# Patient Record
Sex: MALE | Race: WHITE | Employment: UNEMPLOYED | URBAN - METROPOLITAN AREA
[De-identification: names, ages, dates, MRNs, and addresses within clinical notes are randomized per-mention and may not be internally consistent; named-entity substitution may affect disease eponyms.]

---

## 2017-05-22 ENCOUNTER — HOSPITAL ENCOUNTER (OUTPATIENT)
Dept: LAB | Age: 27
Discharge: HOME | End: 2017-05-22
Payer: MEDICARE

## 2017-05-22 ENCOUNTER — OFFICE VISIT (OUTPATIENT)
Dept: INTERNAL MEDICINE CLINIC | Age: 27
End: 2017-05-22

## 2017-05-22 VITALS
HEIGHT: 68 IN | HEART RATE: 86 BPM | SYSTOLIC BLOOD PRESSURE: 123 MMHG | BODY MASS INDEX: 29.4 KG/M2 | DIASTOLIC BLOOD PRESSURE: 82 MMHG | WEIGHT: 194 LBS | OXYGEN SATURATION: 98 % | RESPIRATION RATE: 18 BRPM | TEMPERATURE: 98.1 F

## 2017-05-22 DIAGNOSIS — Z51.81 MEDICATION MONITORING ENCOUNTER: ICD-10-CM

## 2017-05-22 DIAGNOSIS — Z00.00 PREVENTATIVE HEALTH CARE: Primary | ICD-10-CM

## 2017-05-22 PROCEDURE — 85025 COMPLETE CBC W/AUTO DIFF WBC: CPT

## 2017-05-22 PROCEDURE — 80164 ASSAY DIPROPYLACETIC ACD TOT: CPT

## 2017-05-22 PROCEDURE — 80053 COMPREHEN METABOLIC PANEL: CPT

## 2017-05-22 PROCEDURE — 36415 COLL VENOUS BLD VENIPUNCTURE: CPT

## 2017-05-22 RX ORDER — DIVALPROEX SODIUM 250 MG/1
TABLET, DELAYED RELEASE ORAL
Refills: 10 | COMMUNITY
Start: 2017-05-08 | End: 2018-04-30 | Stop reason: ALTCHOICE

## 2017-05-22 NOTE — PATIENT INSTRUCTIONS

## 2017-05-22 NOTE — PROGRESS NOTES
HISTORY OF PRESENT ILLNESS  Roberto Wayne is a 27 y.o. male. HPI  Roberto Wayne is here for complete health maintenance physical exam and screening. he does not have other concerns. Health maintenance hx includes:  Exercise: moderately active. Form of exercise: wts, bike   Diet: generally follows a low fat low cholesterol diet         No results found for: CHOL, CHOLPOCT, CHOLX, CHLST, CHOLV, HDL, HDLPOC, LDL, LDLCPOC, NLDLCT, DLDL, LDLC, DLDLP, VLDLC, VLDL, TGL, TGLX, TRIGL, [id number], TRIGP, TGLPOCT, CHHD, CHHDX    Lab Results   Component Value Date/Time    Glucose 101 08/03/2016 12:14 PM    Glucose (POC) 96 01/03/2014 05:26 PM         Immunizations:     Immunization History   Administered Date(s) Administered    TB Skin Test (PPD) Intradermal 09/17/2013    Tdap 08/01/2013      Immunization status: up to date and documented. Social History     Social History    Marital status: SINGLE     Spouse name: N/A    Number of children: N/A    Years of education: N/A     Occupational History    Not on file. Social History Main Topics    Smoking status: Never Smoker    Smokeless tobacco: Never Used    Alcohol use No      Comment: no     Drug use: No    Sexual activity: No     Other Topics Concern    Not on file     Social History Narrative     History reviewed. No pertinent surgical history. Family History   Problem Relation Age of Onset    Diabetes Mother     Hypertension Mother     Hypertension Father     Heart Disease Father      cardiomyopathy    Heart Disease Maternal Grandmother     Stroke Maternal Grandfather     Cancer Neg Hx     Seizures Neg Hx      Current Outpatient Prescriptions on File Prior to Visit   Medication Sig Dispense Refill    mometasone (NASONEX) 50 mcg/actuation nasal spray 2 Sprays by Both Nostrils route daily. As needed. 2 Container 2    melatonin 3 mg tablet Take 3 mg by mouth as needed.       Cetirizine (ZYRTEC) 10 mg cap Take  by mouth daily as needed.  ibuprofen (MOTRIN) 200 mg tablet Take 200 mg by mouth every six (6) hours as needed for Pain.  DOCUSATE SODIUM (DULCOLAX STOOL SOFTENER, DSS, PO) Take  by mouth. One pill in the pm      omeprazole (PRILOSEC) 20 mg capsule Take 20 mg by mouth daily. No current facility-administered medications on file prior to visit. .    Review of Systems   Constitutional: Negative for malaise/fatigue and weight loss. Eyes: Negative for blurred vision and pain. Respiratory: Negative for cough, shortness of breath and wheezing. Cardiovascular: Negative for chest pain, palpitations and leg swelling. Gastrointestinal: Negative for abdominal pain, blood in stool, constipation, diarrhea, heartburn, nausea and vomiting. Genitourinary: Negative. Musculoskeletal: Positive for joint pain. Negative for back pain and myalgias. Occasional residual R knee/ patellar pain with kneeling. Prior fracture in distant past     Skin: Negative for rash. Neurological: Negative for dizziness, seizures and headaches. Endo/Heme/Allergies: Negative for environmental allergies. Does not bruise/bleed easily. Psychiatric/Behavioral: Negative for depression. The patient is not nervous/anxious and does not have insomnia. Physical Exam   Constitutional: He is oriented to person, place, and time. He appears well-developed and well-nourished. No distress. Body mass index is 29.94 kg/(m^2). /82 (BP 1 Location: Left arm, BP Patient Position: Sitting)  Pulse 86  Temp 98.1 °F (36.7 °C) (Oral)   Resp 18  Ht 5' 7.5\" (1.715 m)  Wt 194 lb (88 kg)  SpO2 98%  BMI 29.94 kg/m2   HENT:   Head: Normocephalic and atraumatic. Right Ear: Hearing, tympanic membrane and ear canal normal.   Left Ear: Hearing, tympanic membrane and ear canal normal.   Nose: Nose normal.   Mouth/Throat: Oropharynx is clear and moist and mucous membranes are normal. Normal dentition.    Eyes: Conjunctivae and lids are normal. Pupils are equal, round, and reactive to light. Right eye exhibits no discharge. Left eye exhibits no discharge. No scleral icterus. Neck: Trachea normal. No thyromegaly present. Cardiovascular: Normal rate, regular rhythm, normal heart sounds, intact distal pulses and normal pulses. Exam reveals no gallop and no friction rub. No murmur heard. Pulmonary/Chest: Effort normal and breath sounds normal. No respiratory distress. Abdominal: Soft. Normal appearance and bowel sounds are normal. He exhibits no distension and no mass. There is no hepatosplenomegaly. There is no tenderness. There is no CVA tenderness. Musculoskeletal: Normal range of motion. He exhibits no edema or tenderness. Lymphadenopathy:     He has no cervical adenopathy. Neurological: He is alert and oriented to person, place, and time. Skin: Skin is warm and dry. No rash noted. He is not diaphoretic. Psychiatric: He has a normal mood and affect. His speech is normal and behavior is normal. Judgment and thought content normal. Cognition and memory are normal.   Nursing note and vitals reviewed. ASSESSMENT and PLAN  Vashti Tineo was seen today for well male. Diagnoses and all orders for this visit:    Preventative health care  -     METABOLIC PANEL, Rickie SPENCER Seaman 89 was counseled on age-appropriate/ guideline-based risk prevention behaviors and screening for a 27y.o. year old   male . We also discussed adjustments in screening based on family history if necessary. Printed instructions for preventative screening guidelines and healthy behaviors given to patient with after visit summary. Medication monitoring encounter  -     METABOLIC PANEL, COMPREHENSIVE  -     CBC WITH AUTOMATED DIFF  -     VALPROIC ACID      Follow-up Disposition:  Return in about 1 year (around 5/22/2018).

## 2017-05-22 NOTE — MR AVS SNAPSHOT
Visit Information Date & Time Provider Department Dept. Phone Encounter #  
 5/22/2017  2:30 PM Warden Aurea MD [facility] [address] [id number] Follow-up Instructions Return in about 1 year (around 5/22/2018). Upcoming Health Maintenance Date Due  
 MEDICARE YEARLY EXAM 9/7/2010 INFLUENZA AGE 9 TO ADULT 8/1/2017 DTaP/Tdap/Td series (2 - Td) 8/1/2023 Allergies as of 5/22/2017  Review Complete On: 5/22/2017 By: Warden Aurea MD  
  
 Severity Noted Reaction Type Reactions Contrast Agent [Iodine]  05/30/2011    Itching Vicodin [Hydrocodone-acetaminophen]  01/12/2012    Other (comments) Mother states that Vicodin makes patient very mean. Current Immunizations  Reviewed on 5/22/2017 Name Date  
 TB Skin Test (PPD) Intradermal 9/17/2013 Tdap 8/1/2013 Reviewed by Warden Aurea MD on 5/22/2017 at  3:05 PM  
 Reviewed by Warden Aurea MD on 5/22/2017 at  3:07 PM  
You Were Diagnosed With   
  
 Codes Comments Preventative health care    -  Primary ICD-10-CM: Z00.00 ICD-9-CM: V70.0 Medication monitoring encounter     ICD-10-CM: Z51.81 
ICD-9-CM: V58.83 Vitals BP Pulse Temp Resp Height(growth percentile) Weight(growth percentile) 123/82 (BP 1 Location: Left arm, BP Patient Position: Sitting) 86 98.1 °F (36.7 °C) (Oral) 18 5' 7.5\" (1.715 m) 194 lb (88 kg) SpO2 BMI Smoking Status 98% 29.94 kg/m2 Never Smoker BMI and BSA Data Body Mass Index Body Surface Area  
 29.94 kg/m 2 2.05 m 2 Preferred Pharmacy Pharmacy Name Phone [facility] [address] [place] [address], [address] [place] [address] [phone] Your Updated Medication List  
  
   
This list is accurate as of: 5/22/17  3:13 PM.  Always use your most recent med list.  
  
  
  
  
 divalproex  mg tablet Commonly known as:  DEPAKOTE  
 TK 2TS PO Q DAY IN THE EVENING  
  
 DULCOLAX STOOL SOFTENER (DSS) PO Take  by mouth. One pill in the pm  
  
 ibuprofen 200 mg tablet Commonly known as:  MOTRIN Take 200 mg by mouth every six (6) hours as needed for Pain.  
  
 melatonin 3 mg tablet Take 3 mg by mouth as needed. mometasone 50 mcg/actuation nasal spray Commonly known as:  NASONEX  
2 Sprays by Both Nostrils route daily. As needed. PriLOSEC 20 mg capsule Generic drug:  omeprazole Take 20 mg by mouth daily. ZyrTEC 10 mg Cap Generic drug:  Cetirizine Take  by mouth daily as needed. We Performed the Following CBC WITH AUTOMATED DIFF [65370 CPT(R)] METABOLIC PANEL, COMPREHENSIVE [98504 CPT(R)] VALPROIC ACID [04758 CPT(R)] Follow-up Instructions Return in about 1 year (around 5/22/2018). Patient Instructions Well Visit, Ages 25 to 48: Care Instructions Your Care Instructions Physical exams can help you stay healthy. Your doctor has checked your overall health and may have suggested ways to take good care of yourself. He or she also may have recommended tests. At home, you can help prevent illness with healthy eating, regular exercise, and other steps. Follow-up care is a key part of your treatment and safety. Be sure to make and go to all appointments, and call your doctor if you are having problems. It's also a good idea to know your test results and keep a list of the medicines you take. How can you care for yourself at home? · Reach and stay at a healthy weight. This will lower your risk for many problems, such as obesity, diabetes, heart disease, and high blood pressure. · Get at least 30 minutes of physical activity on most days of the week. Walking is a good choice. You also may want to do other activities, such as running, swimming, cycling, or playing tennis or team sports. Discuss any changes in your exercise program with your doctor. · Do not smoke or allow others to smoke around you. If you need help quitting, talk to your doctor about stop-smoking programs and medicines. These can increase your chances of quitting for good. · Talk to your doctor about whether you have any risk factors for sexually transmitted infections (STIs). Having one sex partner (who does not have STIs and does not have sex with anyone else) is a good way to avoid these infections. · Use birth control if you do not want to have children at this time. Talk with your doctor about the choices available and what might be best for you. · Protect your skin from too much sun. When you're outdoors from 10 a.m. to 4 p.m., stay in the shade or cover up with clothing and a hat with a wide brim. Wear sunglasses that block UV rays. Even when it's cloudy, put broad-spectrum sunscreen (SPF 30 or higher) on any exposed skin. · See a dentist one or two times a year for checkups and to have your teeth cleaned. · Wear a seat belt in the car. · Drink alcohol in moderation, if at all. That means no more than 2 drinks a day for men and 1 drink a day for women. Follow your doctor's advice about when to have certain tests. These tests can spot problems early. For everyone · Cholesterol. Have the fat (cholesterol) in your blood tested after age 21. Your doctor will tell you how often to have this done based on your age, family history, or other things that can increase your risk for heart disease. · Blood pressure. Have your blood pressure checked during a routine doctor visit. Your doctor will tell you how often to check your blood pressure based on your age, your blood pressure results, and other factors. · Vision. Talk with your doctor about how often to have a glaucoma test. 
· Diabetes. Ask your doctor whether you should have tests for diabetes. · Colon cancer. Have a test for colon cancer at age 48.  You may have one of several tests. If you are younger than 48, you may need a test earlier if you have any risk factors. Risk factors include whether you already had a precancerous polyp removed from your colon or whether your parent, brother, sister, or child has had colon cancer. For women · Breast exam and mammogram. Talk to your doctor about when you should have a clinical breast exam and a mammogram. Medical experts differ on whether and how often women under 50 should have these tests. Your doctor can help you decide what is right for you. · Pap test and pelvic exam. Begin Pap tests at age 24. A Pap test is the best way to find cervical cancer. The test often is part of a pelvic exam. Ask how often to have this test. 
· Tests for sexually transmitted infections (STIs). Ask whether you should have tests for STIs. You may be at risk if you have sex with more than one person, especially if your partners do not wear condoms. For men · Tests for sexually transmitted infections (STIs). Ask whether you should have tests for STIs. You may be at risk if you have sex with more than one person, especially if you do not wear a condom. · Testicular cancer exam. Ask your doctor whether you should check your testicles regularly. · Prostate exam. Talk to your doctor about whether you should have a blood test (called a PSA test) for prostate cancer. Experts differ on whether and when men should have this test. Some experts suggest it if you are older than 39 and are -American or have a father or brother who got prostate cancer when he was younger than 72. When should you call for help? Watch closely for changes in your health, and be sure to contact your doctor if you have any problems or symptoms that concern you. Where can you learn more? Go to http://babak-tiffanie.info/. Enter P072 in the search box to learn more about \"Well Visit, Ages 25 to 48: Care Instructions. \" Current as of: July 19, 2016 Content Version: 11.2 © 3973-0837 [employer], [employer]. Care instructions adapted under license by [employer] (which disclaims liability or warranty for this information). If you have questions about a medical condition or this instruction, always ask your healthcare professional. [address] any warranty or liability for your use of this information. Introducing [facility]! [employer] introduces [employer] patient portal. Now you can access parts of your medical record, email your doctor's office, and request medication refills online. 1. In your internet browser, go to [email or web address] 2. Click on the First Time User? Click Here link in the Sign In box. You will see the New Member Sign Up page. 3. Enter your [employer] Access Code exactly as it appears below. You will not need to use this code after youve completed the sign-up process. If you do not sign up before the expiration date, you must request a new code. · [employer] Access Code: [email or web address] Expires: 8/20/2017  3:13 PM 
 
4. Enter the last four digits of your Social Security Number (xxxx) and Date of Birth (mm/dd/yyyy) as indicated and click Submit. You will be taken to the next sign-up page. 5. Create a [employer] ID. This will be your [employer] login ID and cannot be changed, so think of one that is secure and easy to remember. 6. Create a [employer] password. You can change your password at any time. 7. Enter your Password Reset Question and Answer. This can be used at a later time if you forget your password. 8. Enter your e-mail address. You will receive e-mail notification when new information is available in [address] 9. Click Sign Up. You can now view and download portions of your medical record. 10. Click the Download Summary menu link to download a portable copy of your medical information. If you have questions, please visit the Frequently Asked Questions section of the [employer] website. Remember, [employer] is NOT to be used for urgent needs. For medical emergencies, dial 911. Now available from your iPhone and Android! Please provide this summary of care documentation to your next provider. Your primary care clinician is listed as Paola Raymond. If you have any questions after today's visit, please call [phone].

## 2017-05-23 ENCOUNTER — TELEPHONE (OUTPATIENT)
Dept: INTERNAL MEDICINE CLINIC | Age: 27
End: 2017-05-23

## 2017-05-23 DIAGNOSIS — K21.9 GASTROESOPHAGEAL REFLUX DISEASE, ESOPHAGITIS PRESENCE NOT SPECIFIED: ICD-10-CM

## 2017-05-23 LAB
ALBUMIN SERPL-MCNC: 4.8 G/DL (ref 3.5–5.5)
ALBUMIN/GLOB SERPL: 1.7 {RATIO} (ref 1.2–2.2)
ALP SERPL-CCNC: 59 IU/L (ref 39–117)
ALT SERPL-CCNC: 15 IU/L (ref 0–44)
AST SERPL-CCNC: 16 IU/L (ref 0–40)
BASOPHILS # BLD AUTO: 0 X10E3/UL (ref 0–0.2)
BASOPHILS NFR BLD AUTO: 0 %
BILIRUB SERPL-MCNC: 0.3 MG/DL (ref 0–1.2)
BUN SERPL-MCNC: 17 MG/DL (ref 6–20)
BUN/CREAT SERPL: 23 (ref 9–20)
CALCIUM SERPL-MCNC: 9.9 MG/DL (ref 8.7–10.2)
CHLORIDE SERPL-SCNC: 98 MMOL/L (ref 96–106)
CO2 SERPL-SCNC: 25 MMOL/L (ref 18–29)
CREAT SERPL-MCNC: 0.74 MG/DL (ref 0.76–1.27)
EOSINOPHIL # BLD AUTO: 0.2 X10E3/UL (ref 0–0.4)
EOSINOPHIL NFR BLD AUTO: 3 %
ERYTHROCYTE [DISTWIDTH] IN BLOOD BY AUTOMATED COUNT: 14.2 % (ref 12.3–15.4)
GLOBULIN SER CALC-MCNC: 2.8 G/DL (ref 1.5–4.5)
GLUCOSE SERPL-MCNC: 82 MG/DL (ref 65–99)
HCT VFR BLD AUTO: 44.3 % (ref 37.5–51)
HGB BLD-MCNC: 15 G/DL (ref 12.6–17.7)
IMM GRANULOCYTES # BLD: 0 X10E3/UL (ref 0–0.1)
IMM GRANULOCYTES NFR BLD: 0 %
LYMPHOCYTES # BLD AUTO: 2.6 X10E3/UL (ref 0.7–3.1)
LYMPHOCYTES NFR BLD AUTO: 35 %
MCH RBC QN AUTO: 30.2 PG (ref 26.6–33)
MCHC RBC AUTO-ENTMCNC: 33.9 G/DL (ref 31.5–35.7)
MCV RBC AUTO: 89 FL (ref 79–97)
MONOCYTES # BLD AUTO: 0.6 X10E3/UL (ref 0.1–0.9)
MONOCYTES NFR BLD AUTO: 8 %
NEUTROPHILS # BLD AUTO: 4 X10E3/UL (ref 1.4–7)
NEUTROPHILS NFR BLD AUTO: 54 %
PLATELET # BLD AUTO: 250 X10E3/UL (ref 150–379)
POTASSIUM SERPL-SCNC: 4.4 MMOL/L (ref 3.5–5.2)
PROT SERPL-MCNC: 7.6 G/DL (ref 6–8.5)
RBC # BLD AUTO: 4.97 X10E6/UL (ref 4.14–5.8)
SODIUM SERPL-SCNC: 140 MMOL/L (ref 134–144)
VALPROATE SERPL-MCNC: 73 UG/ML (ref 50–100)
WBC # BLD AUTO: 7.5 X10E3/UL (ref 3.4–10.8)

## 2017-05-23 NOTE — TELEPHONE ENCOUNTER
Patient's mother Jayesh Manual requests that the completed paperwork that was given to Dr. Margi Costello yesterday gets faxed to her at [phone]. Needs to be turned into [employer] by 6/1/17.

## 2017-06-12 DIAGNOSIS — K21.9 GASTROESOPHAGEAL REFLUX DISEASE WITHOUT ESOPHAGITIS: ICD-10-CM

## 2017-06-12 RX ORDER — OMEPRAZOLE 20 MG/1
20 CAPSULE, DELAYED RELEASE ORAL DAILY
Qty: 30 CAP | Refills: 2 | Status: SHIPPED | OUTPATIENT
Start: 2017-06-12 | End: 2018-12-18 | Stop reason: SDUPTHER

## 2017-08-24 ENCOUNTER — HOSPITAL ENCOUNTER (EMERGENCY)
Age: 27
Discharge: HOME | End: 2017-08-24
Attending: EMERGENCY MEDICINE
Payer: MEDICARE

## 2017-08-24 VITALS
TEMPERATURE: 98.2 F | RESPIRATION RATE: 18 BRPM | HEART RATE: 84 BPM | OXYGEN SATURATION: 95 % | SYSTOLIC BLOOD PRESSURE: 139 MMHG | HEIGHT: 68 IN | BODY MASS INDEX: 30.24 KG/M2 | DIASTOLIC BLOOD PRESSURE: 82 MMHG | WEIGHT: 199.52 LBS
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
## 2017-08-24 DIAGNOSIS — S81.851A DOG BITE OF RIGHT LOWER LEG, INITIAL ENCOUNTER: Primary | ICD-10-CM

## 2017-08-24 PROCEDURE — 77030018836 HC SOL IRR NACL ICUM -A

## 2017-08-24 PROCEDURE — 74011250637 HC RX REV CODE- 250/637: Performed by: PHYSICIAN ASSISTANT

## 2017-08-24 PROCEDURE — 77030002916 HC SUT ETHLN J&J -A

## 2017-08-24 PROCEDURE — 99283 EMERGENCY DEPT VISIT LOW MDM: CPT

## 2017-08-24 PROCEDURE — 75810000293 HC SIMP/SUPERF WND  RPR

## 2017-08-24 PROCEDURE — 74011000250 HC RX REV CODE- 250: Performed by: PHYSICIAN ASSISTANT

## 2017-08-24 RX ORDER — AMOXICILLIN AND CLAVULANATE POTASSIUM 875; 125 MG/1; MG/1
1 TABLET, FILM COATED ORAL
Status: COMPLETED | OUTPATIENT
Start: 2017-08-24 | End: 2017-08-24

## 2017-08-24 RX ORDER — AMOXICILLIN AND CLAVULANATE POTASSIUM 875; 125 MG/1; MG/1
1 TABLET, FILM COATED ORAL 2 TIMES DAILY
Qty: 14 TAB | Refills: 0 | Status: SHIPPED | OUTPATIENT
Start: 2017-08-24 | End: 2017-08-24

## 2017-08-24 RX ORDER — LIDOCAINE HYDROCHLORIDE AND EPINEPHRINE 10; 10 MG/ML; UG/ML
1.5 INJECTION, SOLUTION INFILTRATION; PERINEURAL
Status: DISCONTINUED | OUTPATIENT
Start: 2017-08-24 | End: 2017-08-24

## 2017-08-24 RX ORDER — AMOXICILLIN AND CLAVULANATE POTASSIUM 875; 125 MG/1; MG/1
1 TABLET, FILM COATED ORAL 2 TIMES DAILY
Qty: 14 TAB | Refills: 0 | Status: SHIPPED | OUTPATIENT
Start: 2017-08-24 | End: 2017-08-31

## 2017-08-24 RX ORDER — LIDOCAINE HYDROCHLORIDE AND EPINEPHRINE 20; 10 MG/ML; UG/ML
1.5 INJECTION, SOLUTION INFILTRATION; PERINEURAL ONCE
Status: COMPLETED | OUTPATIENT
Start: 2017-08-24 | End: 2017-08-24

## 2017-08-24 RX ADMIN — BACITRACIN, NEOMYCIN, POLYMYXIN B 1 PACKET: 400; 3.5; 5 OINTMENT TOPICAL at 19:56

## 2017-08-24 RX ADMIN — AMOXICILLIN AND CLAVULANATE POTASSIUM 1 TABLET: 875; 125 TABLET, FILM COATED ORAL at 19:55

## 2017-08-24 RX ADMIN — LIDOCAINE HYDROCHLORIDE,EPINEPHRINE BITARTRATE 30 MG: 20; .01 INJECTION, SOLUTION INFILTRATION; PERINEURAL at 20:08

## 2017-08-24 NOTE — ED TRIAGE NOTES
Pt states that he was sliding down a sliding board and a friends dog bit his right lower leg. Abrasion noted to the right mid calf.

## 2017-08-24 NOTE — ED PROVIDER NOTES
HPI Comments: Bouchra Verduzco is a 27 y.o. male who presents ambulatory to ER with c/o multiple abrasions and laceration to R lower leg secondary to dog bite one hour ago. Pt states he was sliding down waterslide when his friends dog jumped up and bit pts right lower leg causing laceration/skin flap to right lower leg, puncture wound to posterior R leg, and multiple abrasions surrounding lower leg. States dog is UTD on rabies and all vaccinations. Bleeding controlled. No other complaints. Td UTD. PCP: Chanel Bolden MD   PMHx significant for: Past Medical History:  No date: Epilepsy [facility])      Comment: started age 5   No date: GERD (gastroesophageal reflux disease)  No date: Intellectual disability  No date: Kidney stone  No date: Psychiatric disorder      Comment: mood disorder    PSHx significant for: History reviewed. No pertinent surgical history. -- Contrast Agent (Iodine) -- Itching   -- Vicodin (Hydrocodone-Acetaminophen) -- Other (comments)    --  Mother states that Vicodin makes patient very             mean. There are no other complaints, changes or physical findings at this time. The history is provided by the patient. Past Medical History:   Diagnosis Date    Epilepsy [facility])     started age 5     GERD (gastroesophageal reflux disease)     Intellectual disability     Kidney stone     Psychiatric disorder     mood disorder       History reviewed. No pertinent surgical history. Family History:   Problem Relation Age of Onset    Diabetes Mother     Hypertension Mother     Hypertension Father     Heart Disease Father      cardiomyopathy    Heart Disease Maternal Grandmother     Stroke Maternal Grandfather     Cancer Neg Hx     Seizures Neg Hx        Social History     Social History    Marital status: SINGLE     Spouse name: N/A    Number of children: N/A    Years of education: N/A     Occupational History    Not on file.      Social History Main Topics  Smoking status: Never Smoker    Smokeless tobacco: Never Used    Alcohol use No      Comment: no     Drug use: No    Sexual activity: No     Other Topics Concern    Not on file     Social History Narrative         ALLERGIES: Contrast agent [iodine] and Vicodin [hydrocodone-acetaminophen]    Review of Systems   Constitutional: Negative. HENT: Negative. Eyes: Negative. Respiratory: Negative. Cardiovascular: Negative. Gastrointestinal: Negative. Genitourinary: Negative. Musculoskeletal: Negative. Skin: Positive for wound (multiple abrasions, puncture wounds R lower leg). Neurological: Negative. Hematological: Negative. Psychiatric/Behavioral: Negative. All other systems reviewed and are negative. Vitals:    08/24/17 1923   BP: 139/82   Pulse: 84   Resp: 18   Temp: 98.2 °F (36.8 °C)   SpO2: 95%   Weight: 90.5 kg (199 lb 8.3 oz)   Height: 5' 8\" (1.727 m)            Physical Exam   Constitutional: He is oriented to person, place, and time. He appears well-developed and well-nourished. No distress. HENT:   Head: Normocephalic and atraumatic. Neck: Normal range of motion. Cardiovascular: Intact distal pulses and normal pulses. Musculoskeletal: Normal range of motion. He exhibits no edema or tenderness. Neurological: He is alert and oriented to person, place, and time. He has normal strength. No sensory deficit. Skin: Skin is warm and dry. No rash noted. He is not diaphoretic. No erythema. No pallor. 1.5cm x 1.5cm gaping, V shaped laceration/skin flap to Right anterior lower leg, bleeding controlled, no FB. Several superficial abrasions to surrounding area and shallow puncture wound to posterior R calf, bleeding controlled, no FB. NVI distally, DP pulse 2+, brisk cap refill  See imaged below   Psychiatric: He has a normal mood and affect. His behavior is normal.   Nursing note and vitals reviewed.        MDM  Number of Diagnoses or Management Options  Dog bite of right lower leg, initial encounter:   Diagnosis management comments: DDx: dog bite, laceration, retained FB       Amount and/or Complexity of Data Reviewed  Discuss the patient with other providers: yes (Dr. Tressa Meadows)    Patient Progress  Patient progress: stable    ED Course       Procedures                       8:07 PM   Mickie Quintana PA-C discussed patient with Catalino Reyes MD who is in agreement with care plan as outlined. No further recommendations. Mickie Quintana PA-C      Procedure Note - Laceration Repair:  8:07 PM  Procedure by Mickie Quintana PA-C . Complexity: simple  1.5cm x 1.5cm V shaped skin flap/ laceration to R lower leg  was irrigated copiously with NS under jet lavage, prepped with safclens and draped in a sterile fashion. The area was anesthetized with 3 mLs of  Lidocaine 2% with epinephrine via local infiltration. The wound was explored with the following results: No foreign bodies found, No tendon laceration seen. The wound was repaired with One layer suture closure: Skin Layer:  3 sutures placed, stitch type:simple interrupted, suture: 4-0 nylon. .  The wound was closed with good hemostasis and loose approximation. Dressing applied. Estimated blood loss: <2ccs  The procedure took 16-30 minutes, and pt tolerated well. Wound in ER pre and post repair:                  8:31 PM  Pt has been reevaluated. There are no new complaints, changes, or physical findings at this time. Medications have been reviewed w/ pt and/or family. Pt and/or family's questions have been answered. Pt and/or family expressed good understanding of the dx/tx/rx and is in agreement with plan of care. Pt instructed and agreed to f/u w/ PCP and to return to ED upon further deterioration. Pt is ready for discharge. LABORATORY TESTS:  No results found for this or any previous visit (from the past 12 hour(s)). IMAGING RESULTS:  No orders to display     No results found.       MEDICATIONS GIVEN:  Medications   amoxicillin-clavulanate (AUGMENTIN) 875-125 mg per tablet 1 Tab (1 Tab Oral Given 8/24/17 1955)   neomycin-bacitracnZn-polymyxnB (NEOSPORIN) ointment 1 Packet (1 Packet Topical Given 8/24/17 1956)   lidocaine-EPINEPHrine (XYLOCAINE) 2 %-1:100,000 injection 30 mg (30 mg IntraDERMal Given by Provider 8/24/17 2008)       IMPRESSION:  1. Dog bite of right lower leg, initial encounter        PLAN:  1. Current Discharge Medication List      START taking these medications    Details   amoxicillin-clavulanate (AUGMENTIN) 875-125 mg per tablet Take 1 Tab by mouth two (2) times a day for 7 days. Qty: 14 Tab, Refills: 0         CONTINUE these medications which have NOT CHANGED    Details   omeprazole (PRILOSEC) 20 mg capsule Take 1 Cap by mouth daily. Qty: 30 Cap, Refills: 2    Associated Diagnoses: Gastroesophageal reflux disease without esophagitis      divalproex DR (DEPAKOTE) 250 mg tablet TK 2TS PO Q DAY IN THE EVENING  Refills: 10      Cetirizine (ZYRTEC) 10 mg cap Take  by mouth daily as needed. mometasone (NASONEX) 50 mcg/actuation nasal spray 2 Sprays by Both Nostrils route daily. As needed. Qty: 2 Container, Refills: 2      melatonin 3 mg tablet Take 3 mg by mouth as needed. ibuprofen (MOTRIN) 200 mg tablet Take 200 mg by mouth every six (6) hours as needed for Pain. Associated Diagnoses: Otalgia of right ear; URI (upper respiratory infection)      DOCUSATE SODIUM (DULCOLAX STOOL SOFTENER, DSS, PO) Take  by mouth. One pill in the pm           2.    Follow-up Information     Follow up With Details Comments Contact Info    Emely Wilkes MD Schedule an appointment as soon as possible for a visit in 10 days For suture removal; 10-14 [facility] [phone]  Internal Med [place] [facility]  [place] [address] [facility] [address] DEPT  If symptoms worsen [place] [address] [place] [address]  [phone]            Return to ED if worse

## 2017-08-25 NOTE — ED NOTES
Pt was discharged and given instructions by PA. Pt and mother verbalized good understanding of all discharge instructions,prescriptions and F/U care. All questions answered. Pt in stable condition on discharge.

## 2017-08-25 NOTE — DISCHARGE INSTRUCTIONS
We hope that we have addressed all of your medical concerns. The examination and treatment you received in the Emergency Department were for an emergent problem and were not intended as complete care. It is important that you follow up with your healthcare provider(s) for ongoing care. If your symptoms worsen or do not improve as expected, and you are unable to reach your usual health care provider(s), you should return to the Emergency Department. Today's healthcare is undergoing tremendous change, and patient satisfaction surveys are one of the many tools to assess the quality of medical care. You may receive a survey from the [employer] regarding your experience in the Emergency Department. I hope that your experience has been completely positive, particularly the medical care that I provided. As such, please participate in the survey; anything less than excellent does not meet my expectations or intentions. [facility] and [address] [place] [address] participate in nationally recognized quality of care measures. If your blood pressure is greater than 120/80, as reported below, we urge that you seek medical care to address the potential of high blood pressure, commonly known as hypertension. Hypertension can be hereditary or can be caused by certain medical conditions, pain, stress, or \"white coat syndrome. \"       Please make an appointment with your health care provider(s) for follow up of your Emergency Department visit. VITALS:   Patient Vitals for the past 8 hrs:   Temp Pulse Resp BP SpO2   08/24/17 1923 98.2 °F (36.8 °C) 84 18 139/82 95 %          Thank you for allowing us to provide you with medical care today. We realize that you have many choices for your emergency care needs. Please choose us in the future for any continued health care needs. Earnstine Schirmer  Marianela Hernández, [address] [place] [address].   Office: [phone]            No results found for this or any previous visit (from the past 24 hour(s)). No results found. Animal Bites: Care Instructions  Your Care Instructions  After an animal bite, the biggest concern is infection. The chance of infection depends on the type of animal that bit you, where on your body you were bitten, and your general health. Many animal bites are not closed with stitches, because this can increase the chance of infection. Your bite may take as little as 7 days or as long as several months to heal, depending on how bad it is. Taking good care of your wound at home will help it heal and reduce your chance of infection. The doctor has checked you carefully, but problems can develop later. If you notice any problems or new symptoms, get medical treatment right away. Follow-up care is a key part of your treatment and safety. Be sure to make and go to all appointments, and call your doctor if you are having problems. It's also a good idea to know your test results and keep a list of the medicines you take. How can you care for yourself at home? · If your doctor told you how to care for your wound, follow your doctor's instructions. If you did not get instructions, follow this general advice:  ¨ After 24 to 48 hours, gently wash the wound with clean water 2 times a day. Do not scrub or soak the wound. Don't use hydrogen peroxide or alcohol, which can slow healing. ¨ You may cover the wound with a thin layer of petroleum jelly, such as Vaseline, and a nonstick bandage. ¨ Apply more petroleum jelly and replace the bandage as needed. · After you shower, gently dry the wound with a clean towel. · If your doctor has closed the wound, cover the bandage with a plastic bag before you take a shower. · A small amount of skin redness and swelling around the wound edges and the stitches or staples is normal. Your wound may itch or feel irritated.  Do not scratch or rub the wound.  · Ask your doctor if you can take an over-the-counter pain medicine, such as acetaminophen (Tylenol), ibuprofen (Advil, Motrin), or naproxen (Aleve). Read and follow all instructions on the label. · Do not take two or more pain medicines at the same time unless the doctor told you to. Many pain medicines have acetaminophen, which is Tylenol. Too much acetaminophen (Tylenol) can be harmful. · If your bite puts you at risk for rabies, you will get a series of shots over the next few weeks to prevent rabies. Your doctor will tell you when to get the shots. It is very important that you get the full cycle of shots. Follow your doctor's instructions exactly. · You may need a tetanus shot if you have not received one in the last 5 years. · If your doctor prescribed antibiotics, take them as directed. Do not stop taking them just because you feel better. You need to take the full course of antibiotics. When should you call for help? Call your doctor now or seek immediate medical care if:  · The skin near the bite turns cold or pale or it changes color. · You lose feeling in the area near the bite, or it feels numb or tingly. · You have trouble moving a limb near the bite. · You have symptoms of infection, such as:  ¨ Increased pain, swelling, warmth, or redness near the wound. ¨ Red streaks leading from the wound. ¨ Pus draining from the wound. ¨ A fever. · Blood soaks through the bandage. Oozing small amounts of blood is normal.  · Your pain is getting worse. Watch closely for changes in your health, and be sure to contact your doctor if you are not getting better as expected. Where can you learn more? Go to http://babak-tiffanie.info/. Enter N942 in the search box to learn more about \"Animal Bites: Care Instructions. \"  Current as of: March 20, 2017  Content Version: 11.3  © 6531-7646 [employer].  Care instructions adapted under license by [employer] (which disclaims liability or warranty for this information). If you have questions about a medical condition or this instruction, always ask your healthcare professional. [address] any warranty or liability for your use of this information. Cuts Closed With Stitches: Care Instructions  Your Care Instructions  A cut can happen anywhere on your body. The doctor used stitches to close the cut. Using stitches also helps the cut heal and reduces scarring. Sometimes pieces of tape called Steri-Strips are put over the stitches. If the cut went deep and through the skin, the doctor may have put in two layers of stitches. The deeper layer brings the deep part of the cut together. These stitches will dissolve and don't need to be removed. The stitches in the upper layer are the ones you see on the cut. You will probably have a bandage over the stitches. You will need to have the stitches removed, usually in 7 to 14 days. The doctor has checked you carefully, but problems can develop later. If you notice any problems or new symptoms, get medical treatment right away. Follow-up care is a key part of your treatment and safety. Be sure to make and go to all appointments, and call your doctor if you are having problems. It's also a good idea to know your test results and keep a list of the medicines you take. How can you care for yourself at home? · Keep the cut dry for the first 24 to 48 hours. After this, you can shower if your doctor okays it. Pat the cut dry. · Don't soak the cut, such as in a bathtub. Your doctor will tell you when it's safe to get the cut wet. · If your doctor told you how to care for your cut, follow your doctor's instructions. If you did not get instructions, follow this general advice:  ¨ After the first 24 to 48 hours, wash around the cut with clean water 2 times a day. Don't use hydrogen peroxide or alcohol, which can slow healing.   ¨ You may cover the cut with a thin layer of petroleum jelly, such as Vaseline, and a nonstick bandage. ¨ Apply more petroleum jelly and replace the bandage as needed. · Prop up the sore area on a pillow anytime you sit or lie down during the next 3 days. Try to keep it above the level of your heart. This will help reduce swelling. · Avoid any activity that could cause your cut to reopen. · Do not remove the stitches on your own. Your doctor will tell you when to come back to have the stitches removed. · Leave Steri-Strips on until they fall off. · Be safe with medicines. Read and follow all instructions on the label. ¨ If the doctor gave you a prescription medicine for pain, take it as prescribed. ¨ If you are not taking a prescription pain medicine, ask your doctor if you can take an over-the-counter medicine. When should you call for help? Call your doctor now or seek immediate medical care if:  · You have new pain, or your pain gets worse. · The skin near the cut is cold or pale or changes color. · You have tingling, weakness, or numbness near the cut. · The cut starts to bleed, and blood soaks through the bandage. Oozing small amounts of blood is normal.  · You have trouble moving the area near the cut. · You have symptoms of infection, such as:  ¨ Increased pain, swelling, warmth, or redness around the cut. ¨ Red streaks leading from the cut. ¨ Pus draining from the cut. ¨ A fever. Watch closely for changes in your health, and be sure to contact your doctor if:  · The cut reopens. · You do not get better as expected. Where can you learn more? Go to http://babak-tiffanie.info/. Enter R217 in the search box to learn more about \"Cuts Closed With Stitches: Care Instructions. \"  Current as of: March 20, 2017  Content Version: 11.3  © 9021-3320 [employer]. Care instructions adapted under license by [employer] (which disclaims liability or warranty for this information).  If you have questions about a medical condition or this instruction, always ask your healthcare professional. [place] [address] any warranty or liability for your use of this information.

## 2017-09-07 ENCOUNTER — OFFICE VISIT (OUTPATIENT)
Dept: INTERNAL MEDICINE CLINIC | Age: 27
End: 2017-09-07

## 2017-09-07 VITALS
HEIGHT: 68 IN | WEIGHT: 200 LBS | OXYGEN SATURATION: 98 % | TEMPERATURE: 98.5 F | BODY MASS INDEX: 30.31 KG/M2 | HEART RATE: 93 BPM | SYSTOLIC BLOOD PRESSURE: 118 MMHG | DIASTOLIC BLOOD PRESSURE: 86 MMHG | RESPIRATION RATE: 18 BRPM

## 2017-09-07 DIAGNOSIS — S81.811A LEG LACERATION, RIGHT, INITIAL ENCOUNTER: Primary | ICD-10-CM

## 2017-09-07 NOTE — PROGRESS NOTES
HISTORY OF PRESENT ILLNESS  Bouchra Verduzco is a 24 y.o. male. HPI  Here for suture removal.  R lateral shin laceration - collided with dog on water slide in pool. Accidental bite. Dog with full shots up to date. Sutured 8/24 at [facility] ER  The patient denies fevers, chills or sweats. Given antibx for prevention. Immunization History   Administered Date(s) Administered    TB Skin Test (PPD) Intradermal 09/17/2013    Tdap 08/01/2013       ROS    Physical Exam   Musculoskeletal:        Legs:  Triangular laceration with very good wound healing, opposition without evidence of cellulitis. No drainage, redness, warmth. Minimally tender. 3 sutures removed after peroxide cleansing. Visit Vitals    /86 (BP 1 Location: Left arm, BP Patient Position: Sitting)    Pulse 93    Temp 98.5 °F (36.9 °C) (Oral)    Resp 18    Ht 5' 8\" (1.727 m)    Wt 200 lb (90.7 kg)    SpO2 98%    BMI 30.41 kg/m2         ASSESSMENT and PLAN  Diagnoses and all orders for this visit:    1. Leg laceration, right, initial encounter - no complications  Wound care discussed.   -     REMOVAL OF SUTURES      Follow-up Disposition: Not on File

## 2018-04-30 ENCOUNTER — OFFICE VISIT (OUTPATIENT)
Dept: INTERNAL MEDICINE CLINIC | Age: 28
End: 2018-04-30

## 2018-04-30 VITALS
TEMPERATURE: 98.6 F | SYSTOLIC BLOOD PRESSURE: 119 MMHG | OXYGEN SATURATION: 96 % | HEIGHT: 68 IN | WEIGHT: 191.25 LBS | RESPIRATION RATE: 18 BRPM | HEART RATE: 78 BPM | BODY MASS INDEX: 28.99 KG/M2 | DIASTOLIC BLOOD PRESSURE: 81 MMHG

## 2018-04-30 DIAGNOSIS — Z11.1 TUBERCULOSIS SCREENING: ICD-10-CM

## 2018-04-30 DIAGNOSIS — Z00.00 PREVENTATIVE HEALTH CARE: Primary | ICD-10-CM

## 2018-04-30 RX ORDER — DIVALPROEX SODIUM 250 MG/1
750 TABLET, DELAYED RELEASE ORAL DAILY
Qty: 1 TAB | Refills: 1
Start: 2018-04-30 | End: 2021-10-01

## 2018-04-30 NOTE — LETTER
4/30/2018 1:25 PM 
 
Mr. Eric Kim [address] [address] To whom it may concern: 
 
Eric Neil was evaluated today for a full physical exam.  It my opinion after this evaluation that he is desiring residence with his mother (under the sponsored residential program). I am in agreement and see no contraindications to that plan. Sincerely, Edvin Flores MD

## 2018-04-30 NOTE — MR AVS SNAPSHOT
Catie Euceda 
 
 
 [address] 
[phone] Patient: Artem Ramirez MRN: [id number] UMO:[birth year] Visit Information Date & Time Provider Department Dept. Phone Encounter #  
 4/30/2018  1:00 PM Kali Mac MD [facility] [id number] Follow-up Instructions Return in about 2 days (around 5/2/2018). Your Appointments 6/4/2018  2:30 PM  
Complete Physical with Kali Mac MD  
[facility]) Appt Note: cpe, cs 4/20/18 [address] [place] [id number]  
[phone]  
  
   
 [address] [facility] [id number] Upcoming Health Maintenance Date Due  
 MEDICARE YEARLY EXAM 3/14/2018 Influenza Age 5 to Adult 8/1/2018 DTaP/Tdap/Td series (2 - Td) 8/1/2023 Allergies as of 4/30/2018  Review Complete On: 4/30/2018 By: Thaddeus Colón LPN Severity Noted Reaction Type Reactions Contrast Agent [Iodine]  05/30/2011    Itching Vicodin [Hydrocodone-acetaminophen]  01/12/2012    Other (comments) Mother states that Vicodin makes patient very mean. Current Immunizations  Reviewed on 4/30/2018 Name Date  
 TB Skin Test (PPD) Intradermal  Incomplete, 9/17/2013 Tdap 8/1/2013 Reviewed by Kali Mac MD on 4/30/2018 at  1:18 PM  
You Were Diagnosed With   
  
 Codes Comments Preventative health care    -  Primary ICD-10-CM: Z00.00 ICD-9-CM: V70.0 Tuberculosis screening     ICD-10-CM: Z11.1 ICD-9-CM: V74.1 Vitals BP Pulse Temp Resp Height(growth percentile) Weight(growth percentile) 119/81 (BP 1 Location: Left arm, BP Patient Position: Sitting) 78 98.6 °F (37 °C) (Oral) 18 5' 8\" (1.727 m) 191 lb 4 oz (86.8 kg) SpO2 BMI Smoking Status 96% 29.08 kg/m2 Never Smoker BMI and BSA Data Body Mass Index Body Surface Area 29.08 kg/m 2 2.04 m 2 Preferred Pharmacy Pharmacy Name Phone [facility] [address]  [facility] [phone] Your Updated Medication List  
  
   
This list is accurate as of 4/30/18  1:25 PM.  Always use your most recent med list.  
  
  
  
  
 divalproex  mg tablet Commonly known as:  DEPAKOTE Take 3 Tabs by mouth daily. DULCOLAX STOOL SOFTENER (DSS) PO Take  by mouth. One pill in the pm  
  
 ibuprofen 200 mg tablet Commonly known as:  MOTRIN Take 200 mg by mouth every six (6) hours as needed for Pain.  
  
 melatonin 3 mg tablet Take 3 mg by mouth as needed. mometasone 50 mcg/actuation nasal spray Commonly known as:  NASONEX  
2 Sprays by Both Nostrils route daily. As needed. omeprazole 20 mg capsule Commonly known as:  PriLOSEC Take 1 Cap by mouth daily. ZyrTEC 10 mg Cap Generic drug:  Cetirizine Take  by mouth daily as needed. We Performed the Following AMB POC TUBERCULOSIS, INTRADERMAL (SKIN TEST) [78577 CPT(R)] Follow-up Instructions Return in about 2 days (around 5/2/2018). Introducing [facility]! [facility] introduces [employer]izer patient portal. Now you can access parts of your medical record, email your doctor's office, and request medication refills online. 1. In your internet browser, go to [email or web address]. [employer]/Wholesome Pets 2. Click on the First Time User? Click Here link in the Sign In box. You will see the New Member Sign Up page. 3. Enter your Pollenizer Access Code exactly as it appears below. You will not need to use this code after youve completed the sign-up process. If you do not sign up before the expiration date, you must request a new code. · Pollenizer Access Code: [email or web address] Expires: 7/29/2018  1:25 PM 
 
 4. Enter the last four digits of your Social Security Number (xxxx) and Date of Birth (mm/dd/yyyy) as indicated and click Submit. You will be taken to the next sign-up page. 5. Create a StreamLink Software ID. This will be your [employer] login ID and cannot be changed, so think of one that is secure and easy to remember. 6. Create a [employer] password. You can change your password at any time. 7. Enter your Password Reset Question and Answer. This can be used at a later time if you forget your password. 8. Enter your e-mail address. You will receive e-mail notification when new information is available in [address] 9. Click Sign Up. You can now view and download portions of your medical record. 10. Click the Download Summary menu link to download a portable copy of your medical information. If you have questions, please visit the Frequently Asked Questions section of the Stream[employer] website. Remember, StreamLink Software is NOT to be used for urgent needs. For medical emergencies, dial 911. Now available from your iPhone and Android! Please provide this summary of care documentation to your next provider. Your primary care clinician is listed as Autumn Daley. If you have any questions after today's visit, please call [phone].

## 2018-05-03 ENCOUNTER — TELEPHONE (OUTPATIENT)
Dept: INTERNAL MEDICINE CLINIC | Age: 28
End: 2018-05-03

## 2018-05-03 DIAGNOSIS — R35.0 URINARY FREQUENCY: Primary | ICD-10-CM

## 2018-05-03 LAB
BILIRUB UR QL STRIP: NEGATIVE
GLUCOSE UR-MCNC: NEGATIVE MG/DL
KETONES P FAST UR STRIP-MCNC: NEGATIVE MG/DL
MM INDURATION POC: 0 MM (ref 0–5)
PH UR STRIP: 6.5 [PH] (ref 4.6–8)
PPD POC: NORMAL NEGATIVE
PROT UR QL STRIP: NEGATIVE
SP GR UR STRIP: 1.02 (ref 1–1.03)
UA UROBILINOGEN AMB POC: NORMAL (ref 0.2–1)
URINALYSIS CLARITY POC: CLEAR
URINALYSIS COLOR POC: NORMAL
URINE BLOOD POC: NORMAL
URINE LEUKOCYTES POC: NEGATIVE
URINE NITRITES POC: NEGATIVE

## 2018-05-03 NOTE — TELEPHONE ENCOUNTER
Patient report urinary frequency without odor. Dipped urine and results are in the system for review can call mother with results.

## 2018-05-03 NOTE — TELEPHONE ENCOUNTER
Pt recently  Passed kidney stone following flank pain, hematuria and normal renal function in ER 4/29/18. Urine dipstick shows negative for all components. Let him know if frequent urination not improved over next week, call office.

## 2018-05-11 ENCOUNTER — DOCUMENTATION ONLY (OUTPATIENT)
Dept: INTERNAL MEDICINE CLINIC | Age: 28
End: 2018-05-11

## 2019-04-15 ENCOUNTER — HOSPITAL ENCOUNTER (OUTPATIENT)
Dept: LAB | Age: 29
Discharge: HOME | End: 2019-04-15
Payer: MEDICARE

## 2019-04-15 ENCOUNTER — OFFICE VISIT (OUTPATIENT)
Dept: INTERNAL MEDICINE CLINIC | Age: 29
End: 2019-04-15

## 2019-04-15 VITALS
HEIGHT: 68 IN | BODY MASS INDEX: 28.49 KG/M2 | RESPIRATION RATE: 20 BRPM | OXYGEN SATURATION: 97 % | DIASTOLIC BLOOD PRESSURE: 82 MMHG | TEMPERATURE: 98.2 F | HEART RATE: 89 BPM | SYSTOLIC BLOOD PRESSURE: 136 MMHG | WEIGHT: 188 LBS

## 2019-04-15 DIAGNOSIS — Z11.1 SCREENING-PULMONARY TB: ICD-10-CM

## 2019-04-15 DIAGNOSIS — Z00.00 ENCOUNTER FOR PREVENTIVE CARE: Primary | ICD-10-CM

## 2019-04-15 LAB
BILIRUB UR QL STRIP: NEGATIVE
GLUCOSE UR-MCNC: NEGATIVE MG/DL
KETONES P FAST UR STRIP-MCNC: NEGATIVE MG/DL
PH UR STRIP: 7 [PH] (ref 4.6–8)
PROT UR QL STRIP: NEGATIVE
SP GR UR STRIP: 1.02 (ref 1–1.03)
UA UROBILINOGEN AMB POC: NORMAL (ref 0.2–1)
URINALYSIS CLARITY POC: CLEAR
URINALYSIS COLOR POC: YELLOW
URINE BLOOD POC: NORMAL
URINE LEUKOCYTES POC: NEGATIVE
URINE NITRITES POC: NEGATIVE

## 2019-04-15 PROCEDURE — 87086 URINE CULTURE/COLONY COUNT: CPT

## 2019-04-15 NOTE — PROGRESS NOTES
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 Patient is due for tetanus, will need script. Patient needs medicare wellness needs to be done, will need to set up appointment and needs to be done before June 1 to maintain sponsored residential services.

## 2019-04-16 NOTE — PROGRESS NOTES
Subjective:   Sumanth Jerez is a 34 y.o. male presenting for his annual checkup. ROS:  Feeling well. No dyspnea or chest pain on exertion. No abdominal pain, change in bowel habits, black or bloody stools. No urinary tract or prostatic symptoms. No neurological complaints. Specific concerns today: Mr Jimena Perez is here today with his mother. He is new to our practice. He was seeing Dr. Lan Green. She shares with me that her son is due to have his annual physical. No new concerns today. He will need a TB test as well. Patient Active Problem List    Diagnosis Date Noted    Epilepsy ([facility] [address] 03/28/2013    Acne vulgaris 03/28/2013    Kidney stone 03/28/2013    GERD (gastroesophageal reflux disease) 03/28/2013     Current Outpatient Medications   Medication Sig Dispense Refill    omeprazole (PRILOSEC) 20 mg capsule Take 1 Cap by mouth daily. 30 Cap 5    bisacodyl (DULCOLAX) 5 mg EC tablet Take 1 Tab by mouth daily. 30 Tab 5    divalproex DR (DEPAKOTE) 250 mg tablet Take 3 Tabs by mouth daily. 1 Tab 1    mometasone (NASONEX) 50 mcg/actuation nasal spray 2 Sprays by Both Nostrils route daily. As needed. 2 Container 2    melatonin 3 mg tablet Take 3 mg by mouth as needed.  Cetirizine (ZYRTEC) 10 mg cap Take  by mouth daily as needed.  ibuprofen (MOTRIN) 200 mg tablet Take 200 mg by mouth every six (6) hours as needed for Pain. Allergies   Allergen Reactions    Contrast Agent [Iodine] Itching    Vicodin [Hydrocodone-Acetaminophen] Other (comments)     Mother states that Vicodin makes patient very mean. Past Medical History:   Diagnosis Date    Epilepsy [facility])     started age 5     GERD (gastroesophageal reflux disease)     Intellectual disability     Kidney stone     Psychiatric disorder     mood disorder     No past surgical history on file.   Family History   Problem Relation Age of Onset    Diabetes Mother     Hypertension Mother     Hypertension Father     Heart Disease Father         cardiomyopathy    Cancer Father         prostate    Heart Disease Maternal Grandmother     COPD Maternal Grandmother     Stroke Maternal Grandfather     Seizures Neg Hx      Social History     Tobacco Use    Smoking status: Never Smoker    Smokeless tobacco: Never Used   Substance Use Topics    Alcohol use: No     Comment: no              Objective:     Visit Vitals  /82   Pulse 89   Temp 98.2 °F (36.8 °C) (Oral)   Resp 20   Ht 5' 8\" (1.727 m)   Wt 188 lb (85.3 kg)   SpO2 97%   BMI 28.59 kg/m²     The patient appears well, alert, oriented x 3, in no distress. ENT normal.  Neck supple. No adenopathy or thyromegaly. BARBARA. Lungs are clear, good air entry, no wheezes, rhonchi or rales. S1 and S2 normal, no murmurs, regular rate and rhythm. Abdomen is soft without tenderness, guarding, mass or organomegaly.  exam: not peformed today. Extremities show no edema, normal peripheral pulses. Neurological is normal without focal findings. Assessment/Plan:   healthy adult male  routine labs ordered. Diagnoses and all orders for this visit:    1. Encounter for preventive care  -     CBC WITH AUTOMATED DIFF  -     METABOLIC PANEL, COMPREHENSIVE  -     LIPID PANEL  -     VALPROIC ACID  -     TSH 3RD GENERATION  -     CULTURE, URINE  -     AMB POC URINALYSIS DIP STICK MANUAL W/O MICRO    2. Screening-pulmonary TB  -     QUANTIFERON-TB GOLD PLUS    .

## 2019-04-17 LAB — BACTERIA UR CULT: NORMAL

## 2019-05-09 ENCOUNTER — HOSPITAL ENCOUNTER (OUTPATIENT)
Dept: LAB | Age: 29
Discharge: HOME | End: 2019-05-09
Payer: MEDICARE

## 2019-05-09 PROCEDURE — 36415 COLL VENOUS BLD VENIPUNCTURE: CPT

## 2019-05-09 PROCEDURE — 80053 COMPREHEN METABOLIC PANEL: CPT

## 2019-05-09 PROCEDURE — 80164 ASSAY DIPROPYLACETIC ACD TOT: CPT

## 2019-05-09 PROCEDURE — 84443 ASSAY THYROID STIM HORMONE: CPT

## 2019-05-09 PROCEDURE — 86480 TB TEST CELL IMMUN MEASURE: CPT

## 2019-05-09 PROCEDURE — 80061 LIPID PANEL: CPT

## 2019-05-09 PROCEDURE — 85025 COMPLETE CBC W/AUTO DIFF WBC: CPT

## 2019-05-10 LAB
ALBUMIN SERPL-MCNC: 4.8 G/DL (ref 3.5–5.5)
ALBUMIN/GLOB SERPL: 1.8 {RATIO} (ref 1.2–2.2)
ALP SERPL-CCNC: 44 IU/L (ref 39–117)
ALT SERPL-CCNC: 17 IU/L (ref 0–44)
AST SERPL-CCNC: 16 IU/L (ref 0–40)
BASOPHILS # BLD AUTO: 0.1 X10E3/UL (ref 0–0.2)
BASOPHILS NFR BLD AUTO: 1 %
BILIRUB SERPL-MCNC: 0.4 MG/DL (ref 0–1.2)
BUN SERPL-MCNC: 15 MG/DL (ref 6–20)
BUN/CREAT SERPL: 18 (ref 9–20)
CALCIUM SERPL-MCNC: 9.7 MG/DL (ref 8.7–10.2)
CHLORIDE SERPL-SCNC: 101 MMOL/L (ref 96–106)
CHOLEST SERPL-MCNC: 185 MG/DL (ref 100–199)
CO2 SERPL-SCNC: 27 MMOL/L (ref 20–29)
CREAT SERPL-MCNC: 0.83 MG/DL (ref 0.76–1.27)
EOSINOPHIL # BLD AUTO: 0.2 X10E3/UL (ref 0–0.4)
EOSINOPHIL NFR BLD AUTO: 5 %
ERYTHROCYTE [DISTWIDTH] IN BLOOD BY AUTOMATED COUNT: 14.8 % (ref 12.3–15.4)
GLOBULIN SER CALC-MCNC: 2.7 G/DL (ref 1.5–4.5)
GLUCOSE SERPL-MCNC: 94 MG/DL (ref 65–99)
HCT VFR BLD AUTO: 43.2 % (ref 37.5–51)
HDLC SERPL-MCNC: 39 MG/DL
HGB BLD-MCNC: 14.7 G/DL (ref 13–17.7)
IMM GRANULOCYTES # BLD AUTO: 0 X10E3/UL (ref 0–0.1)
IMM GRANULOCYTES NFR BLD AUTO: 0 %
INTERPRETATION, 910389: NORMAL
LDLC SERPL CALC-MCNC: 113 MG/DL (ref 0–99)
LYMPHOCYTES # BLD AUTO: 2.1 X10E3/UL (ref 0.7–3.1)
LYMPHOCYTES NFR BLD AUTO: 39 %
MCH RBC QN AUTO: 30 PG (ref 26.6–33)
MCHC RBC AUTO-ENTMCNC: 34 G/DL (ref 31.5–35.7)
MCV RBC AUTO: 88 FL (ref 79–97)
MONOCYTES # BLD AUTO: 0.4 X10E3/UL (ref 0.1–0.9)
MONOCYTES NFR BLD AUTO: 7 %
NEUTROPHILS # BLD AUTO: 2.6 X10E3/UL (ref 1.4–7)
NEUTROPHILS NFR BLD AUTO: 48 %
PLATELET # BLD AUTO: 208 X10E3/UL (ref 150–379)
POTASSIUM SERPL-SCNC: 4.1 MMOL/L (ref 3.5–5.2)
PROT SERPL-MCNC: 7.5 G/DL (ref 6–8.5)
RBC # BLD AUTO: 4.9 X10E6/UL (ref 4.14–5.8)
SODIUM SERPL-SCNC: 142 MMOL/L (ref 134–144)
TRIGL SERPL-MCNC: 163 MG/DL (ref 0–149)
TSH SERPL DL<=0.005 MIU/L-ACNC: 2.03 UIU/ML (ref 0.45–4.5)
VALPROATE SERPL-MCNC: 81 UG/ML (ref 50–100)
VLDLC SERPL CALC-MCNC: 33 MG/DL (ref 5–40)
WBC # BLD AUTO: 5.4 X10E3/UL (ref 3.4–10.8)

## 2019-05-13 LAB
GAMMA INTERFERON BACKGROUND BLD IA-ACNC: 0.02 IU/ML
M TB IFN-G BLD-IMP: NEGATIVE
M TB IFN-G CD4+ BCKGRND COR BLD-ACNC: 0.02 IU/ML
MITOGEN IGNF BLD-ACNC: 9.43 IU/ML
QUANTIFERON INCUBATION, QF1T: NORMAL
QUANTIFERON TB2 AG: 0.02 IU/ML
SERVICE CMNT-IMP: NORMAL

## 2019-05-13 NOTE — PROGRESS NOTES
Please let the patient's mom know his triglycerides are just a little elevated. HDL good cholesterol is a little low. (regular exercise and foods high in omega 3's will help this. Limited sugary foods and foods high in saturated fats. All other labs are okay. I will have his paperwork ready for  by Thursday afternoon. Thanks.

## 2020-02-14 ENCOUNTER — OFFICE VISIT (OUTPATIENT)
Dept: INTERNAL MEDICINE CLINIC | Age: 30
End: 2020-02-14

## 2020-02-14 ENCOUNTER — HOSPITAL ENCOUNTER (OUTPATIENT)
Dept: LAB | Age: 30
Discharge: HOME | End: 2020-02-14

## 2020-02-14 VITALS
RESPIRATION RATE: 18 BRPM | TEMPERATURE: 98.1 F | SYSTOLIC BLOOD PRESSURE: 120 MMHG | BODY MASS INDEX: 28.34 KG/M2 | DIASTOLIC BLOOD PRESSURE: 84 MMHG | WEIGHT: 187 LBS | HEART RATE: 86 BPM | OXYGEN SATURATION: 99 % | HEIGHT: 68 IN

## 2020-02-14 DIAGNOSIS — R10.9 ABDOMINAL PAIN, UNSPECIFIED ABDOMINAL LOCATION: ICD-10-CM

## 2020-02-14 DIAGNOSIS — K21.9 GASTROESOPHAGEAL REFLUX DISEASE, ESOPHAGITIS PRESENCE NOT SPECIFIED: ICD-10-CM

## 2020-02-14 DIAGNOSIS — Z76.89 ESTABLISHING CARE WITH NEW DOCTOR, ENCOUNTER FOR: Primary | ICD-10-CM

## 2020-02-14 DIAGNOSIS — Z87.442 HISTORY OF NEPHROLITHIASIS: ICD-10-CM

## 2020-02-14 DIAGNOSIS — G40.909 NONINTRACTABLE EPILEPSY WITHOUT STATUS EPILEPTICUS, UNSPECIFIED EPILEPSY TYPE (HCC): ICD-10-CM

## 2020-02-14 DIAGNOSIS — Z23 ENCOUNTER FOR IMMUNIZATION: ICD-10-CM

## 2020-02-14 DIAGNOSIS — F81.9 LEARNING DISABILITY: ICD-10-CM

## 2020-02-14 LAB
ALBUMIN SERPL-MCNC: 4 G/DL (ref 3.5–5)
ALBUMIN/GLOB SERPL: 1 {RATIO} (ref 1.1–2.2)
ALP SERPL-CCNC: 48 U/L (ref 45–117)
ALT SERPL-CCNC: 25 U/L (ref 12–78)
ANION GAP SERPL CALC-SCNC: 4 MMOL/L (ref 5–15)
AST SERPL-CCNC: 9 U/L (ref 15–37)
BASOPHILS # BLD: 0.1 K/UL (ref 0–0.1)
BASOPHILS NFR BLD: 1 % (ref 0–1)
BILIRUB SERPL-MCNC: 0.3 MG/DL (ref 0.2–1)
BUN SERPL-MCNC: 20 MG/DL (ref 6–20)
BUN/CREAT SERPL: 25 (ref 12–20)
CALCIUM SERPL-MCNC: 9.6 MG/DL (ref 8.5–10.1)
CHLORIDE SERPL-SCNC: 110 MMOL/L (ref 97–108)
CO2 SERPL-SCNC: 27 MMOL/L (ref 21–32)
CREAT SERPL-MCNC: 0.79 MG/DL (ref 0.7–1.3)
DIFFERENTIAL METHOD BLD: NORMAL
EOSINOPHIL # BLD: 0.1 K/UL (ref 0–0.4)
EOSINOPHIL NFR BLD: 2 % (ref 0–7)
ERYTHROCYTE [DISTWIDTH] IN BLOOD BY AUTOMATED COUNT: 13.2 % (ref 11.5–14.5)
GLOBULIN SER CALC-MCNC: 3.9 G/DL (ref 2–4)
GLUCOSE SERPL-MCNC: 91 MG/DL (ref 65–100)
HCT VFR BLD AUTO: 44.8 % (ref 36.6–50.3)
HGB BLD-MCNC: 14.7 G/DL (ref 12.1–17)
IMM GRANULOCYTES # BLD AUTO: 0 K/UL (ref 0–0.04)
IMM GRANULOCYTES NFR BLD AUTO: 0 % (ref 0–0.5)
LIPASE SERPL-CCNC: 198 U/L (ref 73–393)
LYMPHOCYTES # BLD: 2.2 K/UL (ref 0.8–3.5)
LYMPHOCYTES NFR BLD: 39 % (ref 12–49)
MCH RBC QN AUTO: 30.4 PG (ref 26–34)
MCHC RBC AUTO-ENTMCNC: 32.8 G/DL (ref 30–36.5)
MCV RBC AUTO: 92.8 FL (ref 80–99)
MONOCYTES # BLD: 0.5 K/UL (ref 0–1)
MONOCYTES NFR BLD: 8 % (ref 5–13)
NEUTS SEG # BLD: 2.7 K/UL (ref 1.8–8)
NEUTS SEG NFR BLD: 50 % (ref 32–75)
NRBC # BLD: 0 K/UL (ref 0–0.01)
NRBC BLD-RTO: 0 PER 100 WBC
PLATELET # BLD AUTO: 216 K/UL (ref 150–400)
PMV BLD AUTO: 10.2 FL (ref 8.9–12.9)
POTASSIUM SERPL-SCNC: 4.4 MMOL/L (ref 3.5–5.1)
PROT SERPL-MCNC: 7.9 G/DL (ref 6.4–8.2)
RBC # BLD AUTO: 4.83 M/UL (ref 4.1–5.7)
SODIUM SERPL-SCNC: 141 MMOL/L (ref 136–145)
VALPROATE SERPL-MCNC: 85 UG/ML (ref 50–100)
WBC # BLD AUTO: 5.5 K/UL (ref 4.1–11.1)

## 2020-02-14 RX ORDER — OMEPRAZOLE 40 MG/1
40 CAPSULE, DELAYED RELEASE ORAL DAILY
Qty: 30 CAP | Refills: 3 | Status: SHIPPED | OUTPATIENT
Start: 2020-02-14 | End: 2021-10-26

## 2020-02-14 RX ORDER — FAMOTIDINE 20 MG/1
20 TABLET, FILM COATED ORAL
COMMUNITY
End: 2021-05-17 | Stop reason: SDUPTHER

## 2020-02-14 NOTE — PROGRESS NOTES
Ortiz Appiah is a 34 y.o. male who presents today for Establish Care and GERD  . He has a history of   Patient Active Problem List   Diagnosis Code    Epilepsy ([facility] [place] [facility] G40.909    Acne vulgaris L70.0    Kidney stone N20.0    GERD (gastroesophageal reflux disease) K21.9    History of nephrolithiasis Z87.442    Learning disability F81.9   . Today patient is here to establish care. Previous PCP Dr. Pawan Handley. he is switching because of GERD. Records are available for me to review. he does have other concerns. GERD: has been a bit worse. On 20mg omeprazole. Has had more gas and burping. Two episodes of vomiting. Notes that he does eat often late at night. Denies tobacco, EtOH. No coffee. Denies any C/D. No changes in stools. Epilepsy: Patient  has remained on valproic acid. Levels were checked last year which were normal. Has not had any seizures in 6+ years. Has seen Dr. Van Rocha in the past.     Has had a lifelong learning disability, but is very independent overall. History of kidney stone: last in 2018. Has had several in his life. Social: Lives at home with mother. Has one sister with two children. Works in [employer]. No EtOH or Tobacco.     Very active at work. Family History: reviewed    ROS  Review of Systems   Constitutional: Negative for chills, fever and weight loss. HENT: Negative for congestion, ear pain, hearing loss, sore throat and tinnitus. Eyes: Negative for blurred vision, double vision and photophobia. Respiratory: Negative for cough and shortness of breath. Cardiovascular: Negative for chest pain, palpitations, orthopnea and leg swelling. Gastrointestinal: Positive for abdominal pain, heartburn and nausea. Negative for constipation, diarrhea and vomiting. Increased gas   Genitourinary: Negative for dysuria, frequency and urgency. Musculoskeletal: Negative for back pain, joint pain, myalgias and neck pain.    Skin: Negative for rash. Neurological: Negative. Negative for dizziness and headaches. Endo/Heme/Allergies: Does not bruise/bleed easily. Psychiatric/Behavioral: Negative for depression, hallucinations, memory loss, substance abuse and suicidal ideas. The patient is not nervous/anxious and does not have insomnia. Visit Vitals  /84 (BP 1 Location: Left arm, BP Patient Position: Sitting)   Pulse 86   Temp 98.1 °F (36.7 °C) (Oral)   Resp 18   Ht 5' 8\" (1.727 m)   Wt 187 lb (84.8 kg)   SpO2 99%   BMI 28.43 kg/m²       Physical Exam  Constitutional:       General: He is not in acute distress. HENT:      Head: Normocephalic and atraumatic. Mouth/Throat:      Pharynx: No oropharyngeal exudate. Eyes:      General: No scleral icterus. Conjunctiva/sclera: Conjunctivae normal.      Pupils: Pupils are equal, round, and reactive to light. Neck:      Musculoskeletal: Normal range of motion. Thyroid: No thyromegaly. Vascular: No JVD. Cardiovascular:      Rate and Rhythm: Normal rate and regular rhythm. Heart sounds: No murmur. No friction rub. No gallop. Pulmonary:      Effort: Pulmonary effort is normal. No respiratory distress. Breath sounds: Normal breath sounds. No wheezing. Abdominal:      General: Bowel sounds are normal. There is no distension. Palpations: Abdomen is soft. There is no mass. Tenderness: There is no guarding or rebound. Musculoskeletal: Normal range of motion. Skin:     General: Skin is dry. Findings: No rash. Neurological:      Mental Status: He is alert and oriented to person, place, and time. Cranial Nerves: No cranial nerve deficit. Psychiatric:         Mood and Affect: Mood and affect normal.         Cognition and Memory: Memory normal.         Judgment: Judgment normal.         Current Outpatient Medications   Medication Sig    famotidine (PEPCID) 20 mg tablet Take 20 mg by mouth two (2) times a day.     omeprazole (PRILOSEC) 40 mg capsule Take 1 Cap by mouth daily.  divalproex DR (DEPAKOTE) 250 mg tablet Take 3 Tabs by mouth daily.  mometasone (NASONEX) 50 mcg/actuation nasal spray 2 Sprays by Both Nostrils route daily. As needed.  Cetirizine (ZYRTEC) 10 mg cap Take  by mouth daily as needed.  ibuprofen (MOTRIN) 200 mg tablet Take 200 mg by mouth every six (6) hours as needed for Pain.  melatonin 3 mg tablet Take 3 mg by mouth as needed. No current facility-administered medications for this visit. Past Medical History:   Diagnosis Date    Epilepsy [facility])     started age 5     GERD (gastroesophageal reflux disease)     Intellectual disability     Kidney stone     Psychiatric disorder     mood disorder      History reviewed. No pertinent surgical history. Social History     Tobacco Use    Smoking status: Never Smoker    Smokeless tobacco: Never Used   Substance Use Topics    Alcohol use: No     Comment: no       Family History   Problem Relation Age of Onset    Diabetes Mother     Hypertension Mother     Hypertension Father     Heart Disease Father         cardiomyopathy    Cancer Father         prostate    Heart Disease Maternal Grandmother     COPD Maternal Grandmother     Stroke Maternal Grandfather     Seizures Neg Hx         Allergies   Allergen Reactions    Contrast Agent [Iodine] Itching    Vicodin [Hydrocodone-Acetaminophen] Other (comments)     Mother states that Vicodin makes patient very mean. Assessment/Plan  Diagnoses and all orders for this visit:    1. Establishing care with new doctor, encounter for - I have reviewed his medical history. 2. Encounter for immunization  -     INFLUENZA VIRUS VAC QUAD,SPLIT,PRESV FREE SYRINGE IM  -     CA IMMUNIZ ADMIN,1 SINGLE/COMB VAC/TOXOID    3. Nonintractable epilepsy without status epilepticus, unspecified epilepsy type ([facility] [address] - Stable for some time.   -     METABOLIC PANEL, COMPREHENSIVE;  Future  -     CBC WITH AUTOMATED DIFF; Future  -     VALPROIC ACID; Future    4. Gastroesophageal reflux disease, esophagitis presence not specified - worsening. Increase PPI to 40mg. Discussed lifestyle changes. F/u in 4-6 weeks. -     omeprazole (PRILOSEC) 40 mg capsule; Take 1 Cap by mouth daily. 5. History of nephrolithiasis - Has had several stones in the past.     6. Abdominal pain, unspecified abdominal location - Worsening GERD. Benign abd exam.   -     METABOLIC PANEL, COMPREHENSIVE; Future  -     CBC WITH AUTOMATED DIFF; Future  -     LIPASE; Future    7. Learning disability - overall very independent. Follow-up and Dispositions    · Return in about 6 weeks (around 3/27/2020). Florencio Dc MD  2/14/2020    This note was created with the help of speech recognition software Nina Siddiqui) and may contain some 'sound alike' errors.

## 2020-02-14 NOTE — PATIENT INSTRUCTIONS
Gastroesophageal Reflux Disease (GERD): Care Instructions Your Care Instructions Gastroesophageal reflux disease (GERD) is the backward flow of stomach acid into the esophagus. The esophagus is the tube that leads from your throat to your stomach. A one-way valve prevents the stomach acid from moving up into this tube. When you have GERD, this valve does not close tightly enough. If you have mild GERD symptoms including heartburn, you may be able to control the problem with antacids or over-the-counter medicine. Changing your diet, losing weight, and making other lifestyle changes can also help reduce symptoms. Follow-up care is a key part of your treatment and safety. Be sure to make and go to all appointments, and call your doctor if you are having problems. It's also a good idea to know your test results and keep a list of the medicines you take. How can you care for yourself at home? · Take your medicines exactly as prescribed. Call your doctor if you think you are having a problem with your medicine. · Your doctor may recommend over-the-counter medicine. For mild or occasional indigestion, antacids, such as Tums, Gaviscon, Mylanta, or Maalox, may help. Your doctor also may recommend over-the-counter acid reducers, such as Pepcid AC, Tagamet HB, Zantac 75, or Prilosec. Read and follow all instructions on the label. If you use these medicines often, talk with your doctor. · Change your eating habits. ? It's best to eat several small meals instead of two or three large meals. ? After you eat, wait 2 to 3 hours before you lie down. ? Chocolate, mint, and alcohol can make GERD worse. ? Spicy foods, foods that have a lot of acid (like tomatoes and oranges), and coffee can make GERD symptoms worse in some people. If your symptoms are worse after you eat a certain food, you may want to stop eating that food to see if your symptoms get better. · Do not smoke or chew tobacco. Smoking can make GERD worse. If you need help quitting, talk to your doctor about stop-smoking programs and medicines. These can increase your chances of quitting for good. · If you have GERD symptoms at night, raise the head of your bed 6 to 8 inches by putting the frame on blocks or placing a foam wedge under the head of your mattress. (Adding extra pillows does not work.) · Do not wear tight clothing around your middle. · Lose weight if you need to. Losing just 5 to 10 pounds can help. When should you call for help? Call your doctor now or seek immediate medical care if: 
  · You have new or different belly pain.  
  · Your stools are black and tarlike or have streaks of blood.  
 Watch closely for changes in your health, and be sure to contact your doctor if: 
  · Your symptoms have not improved after 2 days.  
  · Food seems to catch in your throat or chest.  
Where can you learn more? Go to http://babak-tiffanie.info/. Enter N234 in the search box to learn more about \"Gastroesophageal Reflux Disease (GERD): Care Instructions. \" Current as of: November 7, 2018 Content Version: 12.2 © 4405-9708 [employer]. Care instructions adapted under license by [employer] (which disclaims liability or warranty for this information). If you have questions about a medical condition or this instruction, always ask your healthcare professional. [place] [address] any warranty or liability for your use of this information.

## 2020-04-14 ENCOUNTER — TELEPHONE (OUTPATIENT)
Dept: INTERNAL MEDICINE CLINIC | Age: 30
End: 2020-04-14

## 2020-04-14 NOTE — TELEPHONE ENCOUNTER
----- Message from Jason Cotto sent at 4/13/2020  3:45 PM EDT -----  [phone]  Appointment Request From: Cm Valles    With Provider: Luis Muhammad MD [-Primary Care Physician-]    Preferred Date Range: From 4/28/2020 To 6/30/2020    Preferred Times: Any    Reason: To address the following health maintenance concerns.   Medicare Yearly Exam    Comments:  Eugenia is due for his annual wellness checkup.  This is required for Eugenia annually in order to continue with his Sponsored Residential Services with the [facility].  Rosendo prefers times after 10am.  Thank you, Abril Marquez(DSP)  Rosendo prefers to be seen by Dr. Clarke Mcgowaner

## 2020-04-28 ENCOUNTER — OFFICE VISIT (OUTPATIENT)
Dept: INTERNAL MEDICINE CLINIC | Age: 30
End: 2020-04-28

## 2020-04-28 VITALS
HEART RATE: 88 BPM | RESPIRATION RATE: 16 BRPM | DIASTOLIC BLOOD PRESSURE: 84 MMHG | TEMPERATURE: 98.4 F | HEIGHT: 68 IN | BODY MASS INDEX: 26.98 KG/M2 | OXYGEN SATURATION: 98 % | WEIGHT: 178 LBS | SYSTOLIC BLOOD PRESSURE: 120 MMHG

## 2020-04-28 DIAGNOSIS — G40.909 NONINTRACTABLE EPILEPSY WITHOUT STATUS EPILEPTICUS, UNSPECIFIED EPILEPSY TYPE (HCC): ICD-10-CM

## 2020-04-28 DIAGNOSIS — Z00.00 MEDICARE ANNUAL WELLNESS VISIT, SUBSEQUENT: Primary | ICD-10-CM

## 2020-04-28 DIAGNOSIS — K21.9 GASTROESOPHAGEAL REFLUX DISEASE, ESOPHAGITIS PRESENCE NOT SPECIFIED: ICD-10-CM

## 2020-04-28 NOTE — ACP (ADVANCE CARE PLANNING)
Advance Care Planning Advance Care Planning (ACP) Physician/NP/PA (Provider) Raivnder Elizondo Date of ACP Conversation: 4/28/2020 Conversation Conducted with:   {Discussion Participants:84928::\"Patient with Decision Making Capacity\"} *If present, Decision Maker was asked to consider and make decisions based on patient values, known preferences, or best interests. Current Designated Health Care Decision Maker:  
(as entered in [address] field) If no Decision Maker listed above or available through scanned documents, then: 
 
[address] [place] [address] Bonny: 
Who do you trust to make healthcare decisions for you? Name: *** phone  number: *** Can this person be reached and be able to respond quickly, such as within a few minutes or hours? {YES/NO:69937} Who would be your back-up decision maker? Name *** phone number:*** For below questions, when conducting conversation with Lian, substitute \"he\" and \"his\" for \"you\" and \"your\". Hospitalization: \"If your health were to worsen and it became clear that your chance of recovery was unlikely, what would your preference be regarding hospitalization? \" 
{hospitalization preference:84354} Ventilation: \"If you were in your present state of health and suddenly became very ill and were unable to breathe on your own, what would your preference be about the use of a ventilator (breathing machine) if it were available to you? \"   
{ventilator preference:05129} \"If your health were to worsen and it became clear that your chance of recovery was unlikely, would that change your answer? \"  
{ventilator pref follow up question:09522} Resuscitation: \"CPR works best to restart the heart when there is a sudden event, like a heart attack, in someone who is otherwise healthy. Unfortunately, CPR does not typically restart the heart for people who have serious health conditions or who are very sick. \" 
 \"In the event your heart stopped, would you want attempts to restart your heart (answer \"yes\" for attempt to resuscitate) or would you prefer a natural death (answer \"no\" for do not attempt to resuscitate)? \"  
{CPR preference:24010} \"If your health were to worsen and it became clear that your chance of recovery was unlikely, would that change your answer? \"  
{CPR pref follow up question:99986} Conversation Outcomes / Follow-Up Plan:  
{ACP; outcomes:53817} Length of ACP Conversation in minutes:  {TIME; ACP time 16 min - 1 hour:04622::\"<16 minutes (Non-Billable)\"} Coding Instructions:  ACP is eligible as a billable service through Medicare in conjunction with either an AWV or a problem-oriented E/M visit if it entails dedicated discussion >/=16 minutes. Other insurance carriers may or may not cover ACP services. 16-30 minutes: code 38555 
>30 minutes: code 72204 Add 25 modifier if reported with an E/M visit (deductible and co-pays may apply) Add 33 modifier if reported with an AWV to waive patient cost-sharing

## 2020-04-28 NOTE — PATIENT INSTRUCTIONS
Medicare Wellness Visit, Male The best way to live healthy is to have a lifestyle where you eat a well-balanced diet, exercise regularly, limit alcohol use, and quit all forms of tobacco/nicotine, if applicable. Regular preventive services are another way to keep healthy. Preventive services (vaccines, screening tests, monitoring & exams) can help personalize your care plan, which helps you manage your own care. Screening tests can find health problems at the earliest stages, when they are easiest to treat. [facility] follows the current, evidence-based guidelines published by the [facility] Samy Brenna ([facility]) when recommending preventive services for our patients. Because we follow these guidelines, sometimes recommendations change over time as research supports it. (For example, a prostate screening blood test is no longer routinely recommended for men with no symptoms). Of course, you and your doctor may decide to screen more often for some diseases, based on your risk and co-morbidities (chronic disease you are already diagnosed with). Preventive services for you include: - Medicare offers their members a free annual wellness visit, which is time for you and your primary care provider to discuss and plan for your preventive service needs. Take advantage of this benefit every year! 
-All adults over age 72 should receive the recommended pneumonia vaccines. Current USPSTF guidelines recommend a series of two vaccines for the best pneumonia protection.  
-All adults should have a flu vaccine yearly and tetanus vaccine every 10 years. 
-All adults age 48 and older should receive the shingles vaccines (series of two vaccines).       
-All adults age 38-68 who are overweight should have a diabetes screening test once every three years.  
-Other screening tests & preventive services for persons with diabetes include: an eye exam to screen for diabetic retinopathy, a kidney function test, a foot exam, and stricter control over your cholesterol.  
-Cardiovascular screening for adults with routine risk involves an electrocardiogram (ECG) at intervals determined by the provider.  
-Colorectal cancer screening should be done for adults age 54-65 with no increased risk factors for colorectal cancer. There are a number of acceptable methods of screening for this type of cancer. Each test has its own benefits and drawbacks. Discuss with your provider what is most appropriate for you during your annual wellness visit. The different tests include: colonoscopy (considered the best screening method), a fecal occult blood test, a fecal DNA test, and sigmoidoscopy. 
-All adults born between [facility] should be screened once for Hepatitis C. 
-An Abdominal Aortic Aneurysm (AAA) Screening is recommended for men age 73-68 who has ever smoked in their lifetime. Here is a list of your current Health Maintenance items (your personalized list of preventive services) with a due date: 
Health Maintenance Due Topic Date Due  
 Annual Well Visit  03/14/2018

## 2020-04-28 NOTE — LETTER
4/28/2020 1:56 PM 
 
Mr. Leslie [place] [facility] [id number] To whom it may concern, To whom it may concern: 
  
Leslie Montiel was evaluated today for a full physical exam.  It my opinion after this evaluation that he is desiring residence with his mother (under the sponsored residential program). I am in agreement and see no contraindications to that plan. 
  
  
 
Sincerely, Didi Lee MD

## 2020-04-28 NOTE — PROGRESS NOTES
Clarke Tineo is a 34 y.o. male who presents today for Annual Wellness Visit  . He has a history of   Patient Active Problem List   Diagnosis Code    Epilepsy ([facility] G40.909    Acne vulgaris L70.0    Kidney stone N20.0    GERD (gastroesophageal reflux disease) K21.9    History of nephrolithiasis Z87.442    Learning disability F81.9   . Today patient is here for Medicare wellness. he does have other concerns. GERD: At last visit we increased his omeprazole to 40 mg. He was having more vomiting as well as burping and gas. He notes that this has improved but still having some bloating and abd pain. Mother notes that he seems to want to eat less due to fear of having abdominal pain. His blood work was relatively normal.  Weight has trended down. Epilepsy: Patient  has remained on valproic acid. Levels were checked last year which were normal. Has not had any seizures in 6+ years. Has seen Dr. Jessica Holden in the past.      Has had a lifelong learning disability, but is very independent overall.      History of kidney stone: last in 2018. Has had several in his life.      Social: Lives at home with mother. Has one sister with two children. Works in [employer]. No EtOH or Tobacco.                Very active at work. HM: immunizations are all UTD. ROS  Review of Systems   Constitutional: Negative for chills, fever and weight loss. HENT: Negative for congestion and sore throat. Eyes: Negative for blurred vision, double vision and photophobia. Respiratory: Negative for cough and shortness of breath. Cardiovascular: Negative for chest pain, palpitations and leg swelling. Gastrointestinal: Positive for abdominal pain, heartburn and nausea. Negative for constipation, diarrhea and vomiting. Genitourinary: Negative for dysuria, frequency and urgency. Musculoskeletal: Negative for joint pain and myalgias.    Skin: Negative for rash.   Neurological: Negative. Negative for headaches. Seizures: none recently. Endo/Heme/Allergies: Does not bruise/bleed easily. Psychiatric/Behavioral: Negative for memory loss and suicidal ideas. Visit Vitals  /84 (BP 1 Location: Left arm, BP Patient Position: Sitting)   Pulse 88   Temp 98.4 °F (36.9 °C) (Oral)   Resp 16   Ht 5' 8\" (1.727 m)   Wt 178 lb (80.7 kg)   SpO2 98%   BMI 27.06 kg/m²       Physical Exam  Constitutional:       Appearance: He is well-developed. He is not diaphoretic. HENT:      Head: Normocephalic and atraumatic. Eyes:      Pupils: Pupils are equal, round, and reactive to light. Neck:      Musculoskeletal: Normal range of motion and neck supple. Vascular: No JVD. Cardiovascular:      Rate and Rhythm: Normal rate and regular rhythm. Heart sounds: No murmur. Pulmonary:      Effort: Pulmonary effort is normal. No respiratory distress. Breath sounds: Normal breath sounds. No wheezing. Abdominal:      General: Bowel sounds are normal. There is no distension. Palpations: Abdomen is soft. Tenderness: There is no abdominal tenderness. Comments: No guarding no significant abdominal pain with palpation. No hepatomegaly noted. Musculoskeletal: Normal range of motion. Skin:     General: Skin is warm and dry. Neurological:      Mental Status: He is alert and oriented to person, place, and time. Cranial Nerves: No cranial nerve deficit. Psychiatric:         Behavior: Behavior normal.           Current Outpatient Medications   Medication Sig    famotidine (PEPCID) 20 mg tablet Take 20 mg by mouth two (2) times a day.  omeprazole (PRILOSEC) 40 mg capsule Take 1 Cap by mouth daily.  divalproex DR (DEPAKOTE) 250 mg tablet Take 3 Tabs by mouth daily.  mometasone (NASONEX) 50 mcg/actuation nasal spray 2 Sprays by Both Nostrils route daily. As needed.  melatonin 3 mg tablet Take 3 mg by mouth as needed.     Cetirizine (ZYRTEC) 10 mg cap Take  by mouth daily as needed.  ibuprofen (MOTRIN) 200 mg tablet Take 200 mg by mouth every six (6) hours as needed for Pain. No current facility-administered medications for this visit. Past Medical History:   Diagnosis Date    Epilepsy [facility])     started age 5     GERD (gastroesophageal reflux disease)     Intellectual disability     Kidney stone     Psychiatric disorder     mood disorder      History reviewed. No pertinent surgical history. Social History     Tobacco Use    Smoking status: Never Smoker    Smokeless tobacco: Never Used   Substance Use Topics    Alcohol use: No     Comment: no       Family History   Problem Relation Age of Onset    Diabetes Mother     Hypertension Mother     Hypertension Father     Heart Disease Father         cardiomyopathy    Cancer Father         prostate    Heart Disease Maternal Grandmother     COPD Maternal Grandmother     Stroke Maternal Grandfather     Seizures Neg Hx         Allergies   Allergen Reactions    Contrast Agent [Iodine] Itching    Vicodin [Hydrocodone-Acetaminophen] Other (comments)     Mother states that Vicodin makes patient very mean. Assessment/Plan  Diagnoses and all orders for this visit:    1. Medicare annual wellness visit, subsequent - HM is UTD, Hilda Burnette was counseled on age-appropriate/ guideline-based risk prevention behaviors and screening for a 34y.o. year old   male . We also discussed adjustments in screening based on family history if necessary. Printed instructions for preventative screening guidelines and healthy behaviors given to patient with after visit summary. 2. Advanced directives, counseling/discussion-suggest advance care planning and providing us with a copy. 3. Nonintractable epilepsy without status epilepticus, unspecified epilepsy type (HCC)-stable on current therapy.     4. Gastroesophageal reflux disease, esophagitis presence not specified-improved with higher dose PPI but still having significant symptoms. Will screen for H. pylori but if this persist suggest seeing gastroenterologist for consideration for EGD  -     H PYLORI AG, STOOL; Future  -     REFERRAL TO GASTROENTEROLOGY    Letter regarding housing accommodations provided to mother. Robert Nuñez MD  4/28/2020    This note was created with the help of speech recognition software Neftaly Edgar) and may contain some 'sound alike' errors. This is the Subsequent Medicare Annual Wellness Exam, performed 12 months or more after the Initial AWV or the last Subsequent AWV    I have reviewed the patient's medical history in detail and updated the computerized patient record. History     Patient Active Problem List   Diagnosis Code    Epilepsy ([facility] G40.909    Acne vulgaris L70.0    Kidney stone N20.0    GERD (gastroesophageal reflux disease) K21.9    History of nephrolithiasis Z87.442    Learning disability F81.9     Past Medical History:   Diagnosis Date    Epilepsy ([facility]     started age 5     GERD (gastroesophageal reflux disease)     Intellectual disability     Kidney stone     Psychiatric disorder     mood disorder      History reviewed. No pertinent surgical history. Current Outpatient Medications   Medication Sig Dispense Refill    famotidine (PEPCID) 20 mg tablet Take 20 mg by mouth two (2) times a day.  omeprazole (PRILOSEC) 40 mg capsule Take 1 Cap by mouth daily. 30 Cap 3    divalproex DR (DEPAKOTE) 250 mg tablet Take 3 Tabs by mouth daily. 1 Tab 1    mometasone (NASONEX) 50 mcg/actuation nasal spray 2 Sprays by Both Nostrils route daily. As needed. 2 Container 2    melatonin 3 mg tablet Take 3 mg by mouth as needed.  Cetirizine (ZYRTEC) 10 mg cap Take  by mouth daily as needed.  ibuprofen (MOTRIN) 200 mg tablet Take 200 mg by mouth every six (6) hours as needed for Pain.        Allergies   Allergen Reactions    Contrast Agent [Iodine] Itching    Vicodin [Hydrocodone-Acetaminophen] Other (comments)     Mother states that Vicodin makes patient very mean. Family History   Problem Relation Age of Onset    Diabetes Mother     Hypertension Mother     Hypertension Father     Heart Disease Father         cardiomyopathy    Cancer Father         prostate    Heart Disease Maternal Grandmother     COPD Maternal Grandmother     Stroke Maternal Grandfather     Seizures Neg Hx      Social History     Tobacco Use    Smoking status: Never Smoker    Smokeless tobacco: Never Used   Substance Use Topics    Alcohol use: No     Comment: no        Depression Risk Factor Screening:     3 most recent PHQ Screens 4/28/2020   Little interest or pleasure in doing things Not at all   Feeling down, depressed, irritable, or hopeless Not at all   Total Score PHQ 2 0   Trouble falling or staying asleep, or sleeping too much Not at all   Feeling tired or having little energy Not at all   Poor appetite, weight loss, or overeating Not at all   Feeling bad about yourself - or that you are a failure or have let yourself or your family down Not at all   Trouble concentrating on things such as school, work, reading, or watching TV Not at all   Moving or speaking so slowly that other people could have noticed; or the opposite being so fidgety that others notice Not at all   Thoughts of being better off dead, or hurting yourself in some way Not at all   PHQ 9 Score 0   How difficult have these problems made it for you to do your work, take care of your home and get along with others Not difficult at all       Alcohol Risk Factor Screening (MALE < 65): Do you average more than 2 drinks per night or 14 drinks a week: No    On any one occasion in the past three months have you have had more than 4 drinks containing alcohol:  No      Functional Ability and Level of Safety:   Hearing: Hearing is good. Activities of Daily Living:   The home contains: no safety equipment. Patient does total self care    Ambulation: with no difficulty    Fall Risk:  Fall Risk Assessment, last 12 mths 4/28/2020   Able to walk? Yes   Fall in past 12 months? No       Abuse Screen:  Patient is not abused    Cognitive Screening   Has your family/caregiver stated any concerns about your memory: yes - But this has been stable over time. Patient Care Team   Patient Care Team:  Kenzie Gomez MD as PCP - General (Internal Medicine)  Kenzie Gomez MD as PCP - [facility] Empaneled Provider  Karol Roberts MD (Sleep Medicine)    Assessment/Plan   Education and counseling provided:  Are appropriate based on today's review and evaluation  End-of-Life planning (with patient's consent)    Diagnoses and all orders for this visit:    1. Medicare annual wellness visit, subsequent    2.  Advanced directives, counseling/discussion        Health Maintenance Due   Topic Date Due    Medicare Yearly Exam  03/14/2018

## 2020-04-29 ENCOUNTER — HOSPITAL ENCOUNTER (OUTPATIENT)
Dept: LAB | Age: 30
Discharge: HOME | End: 2020-04-29

## 2020-04-29 DIAGNOSIS — K21.9 GASTROESOPHAGEAL REFLUX DISEASE, ESOPHAGITIS PRESENCE NOT SPECIFIED: ICD-10-CM

## 2020-04-30 ENCOUNTER — DOCUMENTATION ONLY (OUTPATIENT)
Dept: INTERNAL MEDICINE CLINIC | Age: 30
End: 2020-04-30

## 2020-05-01 LAB
H PYLORI AG STL QL IA: NEGATIVE
SPECIMEN SOURCE: NORMAL

## 2020-05-01 NOTE — PROGRESS NOTES
Please inform patient and her mother that his H. pylori screen is negative for normal.  I suggest that he sees gastroenterologist that I referred him to.

## 2021-03-11 ENCOUNTER — TELEPHONE (OUTPATIENT)
Dept: INTERNAL MEDICINE CLINIC | Age: 31
End: 2021-03-11

## 2021-03-11 NOTE — TELEPHONE ENCOUNTER
Offered in person appt this pm- unable to make it. Scheduled in person with Dr Ashlyn Nicholas tomorrow at 11:30.

## 2021-03-11 NOTE — TELEPHONE ENCOUNTER
Patient's mother called to report patient is having  right ear pain, drainage, ringing. PSR scheduled VV w/ provider in practice 3.12.2021. Patient's mother request nurse please return her call to advise if PCP is able to schedule with patient in office for 3.12.2021, AM.     Please call Mari Kaurew, Mother,  to advise.    [phone]

## 2021-03-12 ENCOUNTER — OFFICE VISIT (OUTPATIENT)
Dept: INTERNAL MEDICINE CLINIC | Age: 31
End: 2021-03-12
Payer: MEDICARE

## 2021-03-12 VITALS
DIASTOLIC BLOOD PRESSURE: 78 MMHG | SYSTOLIC BLOOD PRESSURE: 124 MMHG | TEMPERATURE: 98.1 F | WEIGHT: 173 LBS | RESPIRATION RATE: 18 BRPM | BODY MASS INDEX: 26.22 KG/M2 | OXYGEN SATURATION: 98 % | HEART RATE: 89 BPM | HEIGHT: 68 IN

## 2021-03-12 DIAGNOSIS — H66.91 OTITIS OF RIGHT EAR: Primary | ICD-10-CM

## 2021-03-12 PROCEDURE — G8427 DOCREV CUR MEDS BY ELIG CLIN: HCPCS | Performed by: INTERNAL MEDICINE

## 2021-03-12 PROCEDURE — G8510 SCR DEP NEG, NO PLAN REQD: HCPCS | Performed by: INTERNAL MEDICINE

## 2021-03-12 PROCEDURE — 99213 OFFICE O/P EST LOW 20 MIN: CPT | Performed by: INTERNAL MEDICINE

## 2021-03-12 PROCEDURE — G0463 HOSPITAL OUTPT CLINIC VISIT: HCPCS | Performed by: INTERNAL MEDICINE

## 2021-03-12 PROCEDURE — G8419 CALC BMI OUT NRM PARAM NOF/U: HCPCS | Performed by: INTERNAL MEDICINE

## 2021-03-12 RX ORDER — MOMETASONE FUROATE 50 UG/1
2 SPRAY, METERED NASAL DAILY
Qty: 2 CONTAINER | Refills: 2 | Status: SHIPPED | OUTPATIENT
Start: 2021-03-12 | End: 2022-03-09

## 2021-03-12 RX ORDER — CIPROFLOXACIN AND DEXAMETHASONE 3; 1 MG/ML; MG/ML
4 SUSPENSION/ DROPS AURICULAR (OTIC) 2 TIMES DAILY
Qty: 7.5 ML | Refills: 0 | Status: SHIPPED | OUTPATIENT
Start: 2021-03-12 | End: 2021-03-17

## 2021-03-12 RX ORDER — AMOXICILLIN AND CLAVULANATE POTASSIUM 875; 125 MG/1; MG/1
1 TABLET, FILM COATED ORAL EVERY 12 HOURS
Qty: 10 TAB | Refills: 0 | Status: SHIPPED | OUTPATIENT
Start: 2021-03-12 | End: 2021-03-17

## 2021-03-12 NOTE — PROGRESS NOTES
Stephane Gauthier is a 31 y.o. male who presents today for Ear Pain  . He has a history of   Patient Active Problem List   Diagnosis Code    Epilepsy ([facility] G40.909    Acne vulgaris L70.0    Kidney stone N20.0    GERD (gastroesophageal reflux disease) K21.9    History of nephrolithiasis Z87.442    Learning disability F81.9   . Today patient is here for an acute visit. .     Problem visit:  Stephane Gauthier is here for complaint of R ear pain. Problem began 2 week(s) ago. Severity is moderate  Character of problem: Ache and drainage  Associated symptoms include: no congestion. Sometimes has clear liquid to pillow. ROS  Review of Systems   Constitutional: Negative for chills, fever and weight loss. HENT: Positive for ear discharge and ear pain. Negative for congestion and sore throat. Eyes: Negative for blurred vision, double vision and photophobia. Respiratory: Negative for cough and shortness of breath. Cardiovascular: Negative for chest pain, palpitations and leg swelling. Gastrointestinal: Negative for abdominal pain, constipation, diarrhea, heartburn, nausea and vomiting. Genitourinary: Negative for dysuria, frequency and urgency. Musculoskeletal: Negative for joint pain and myalgias. Skin: Negative for rash. Neurological: Negative. Negative for headaches. Endo/Heme/Allergies: Does not bruise/bleed easily. Psychiatric/Behavioral: Negative for memory loss and suicidal ideas. Visit Vitals  /78 (BP 1 Location: Left upper arm, BP Patient Position: Sitting, BP Cuff Size: Large adult)   Pulse 89   Temp 98.1 °F (36.7 °C) (Oral)   Resp 18   Ht 5' 8\" (1.727 m)   Wt 173 lb (78.5 kg)   SpO2 98%   BMI 26.30 kg/m²       Physical Exam  Constitutional:       Appearance: He is well-developed. He is not diaphoretic. HENT:      Head: Normocephalic and atraumatic.       Ears:      Comments: Significantly inflamed right ear canal with purulent material.  Tympanic membrane with signs of pressure behind it. Eyes:      Pupils: Pupils are equal, round, and reactive to light. Cardiovascular:      Rate and Rhythm: Normal rate and regular rhythm. Heart sounds: No murmur. Pulmonary:      Effort: Pulmonary effort is normal. No respiratory distress. Breath sounds: Normal breath sounds. Musculoskeletal: Normal range of motion. Skin:     General: Skin is warm and dry. Neurological:      Mental Status: He is alert and oriented to person, place, and time. Psychiatric:         Behavior: Behavior normal.           Current Outpatient Medications   Medication Sig    famotidine (PEPCID) 20 mg tablet Take 20 mg by mouth.  omeprazole (PRILOSEC) 40 mg capsule Take 1 Cap by mouth daily.  divalproex DR (DEPAKOTE) 250 mg tablet Take 3 Tabs by mouth daily.  melatonin 3 mg tablet Take 3 mg by mouth as needed.  Cetirizine (ZYRTEC) 10 mg cap Take  by mouth daily as needed.  ibuprofen (MOTRIN) 200 mg tablet Take 200 mg by mouth every six (6) hours as needed for Pain.  mometasone (NASONEX) 50 mcg/actuation nasal spray 2 Sprays by Both Nostrils route daily. As needed. No current facility-administered medications for this visit. Past Medical History:   Diagnosis Date    Epilepsy [facility])     started age 5     GERD (gastroesophageal reflux disease)     Intellectual disability     Kidney stone     Psychiatric disorder     mood disorder      No past surgical history on file.    Social History     Tobacco Use    Smoking status: Never Smoker    Smokeless tobacco: Never Used   Substance Use Topics    Alcohol use: No     Comment: no       Family History   Problem Relation Age of Onset    Diabetes Mother     Hypertension Mother     Hypertension Father     Heart Disease Father         cardiomyopathy    Cancer Father         prostate    Heart Disease Maternal Grandmother     COPD Maternal Grandmother     Stroke Maternal Grandfather     Seizures Neg Hx Allergies   Allergen Reactions    Contrast Agent [Iodine] Itching    Vicodin [Hydrocodone-Acetaminophen] Other (comments)     Mother states that Vicodin makes patient very mean. Assessment/Plan  Diagnoses and all orders for this visit:    1. Otitis of right ear- no signs of perforated ear drum. -     ciprofloxacin-dexamethasone (CIPRODEX) 0.3-0.1 % otic suspension; Administer 4 Drops in right ear two (2) times a day for 5 days. -     amoxicillin-clavulanate (AUGMENTIN) 875-125 mg per tablet; Take 1 Tab by mouth every twelve (12) hours for 5 days. -     mometasone (Nasonex) 50 mcg/actuation nasal spray; 2 Sprays by Both Nostrils route daily. As needed. Yair Gill MD  3/12/2021    This note was created with the help of speech recognition software Sammy Godfrey) and may contain some 'sound alike' errors.

## 2021-03-12 NOTE — PROGRESS NOTES
Pt has had pain for about 2 weeks on the right side, experiencing some ringing and the pain radiates to his jaw, his right side of his head hurts   Can you write a note to discontinue the Doculax?

## 2021-03-31 ENCOUNTER — DOCUMENTATION ONLY (OUTPATIENT)
Dept: INTERNAL MEDICINE CLINIC | Age: 31
End: 2021-03-31

## 2021-03-31 ENCOUNTER — TELEPHONE (OUTPATIENT)
Dept: INTERNAL MEDICINE CLINIC | Age: 31
End: 2021-03-31

## 2021-04-08 ENCOUNTER — TELEPHONE (OUTPATIENT)
Dept: INTERNAL MEDICINE CLINIC | Age: 31
End: 2021-04-08

## 2021-04-08 NOTE — TELEPHONE ENCOUNTER
Return call to [facility]- no extension #. Was on general hold for 5 minutes then call disconnected.

## 2021-04-08 NOTE — TELEPHONE ENCOUNTER
Vinay Khalil from [address] called and wanted to verify patient's contact number and diagnosis. PSR verified phone number. Please call Vinay Khalil at [phone]  to verify his diagnosis.

## 2021-05-17 ENCOUNTER — OFFICE VISIT (OUTPATIENT)
Dept: INTERNAL MEDICINE CLINIC | Age: 31
End: 2021-05-17
Payer: MEDICARE

## 2021-05-17 VITALS
RESPIRATION RATE: 14 BRPM | BODY MASS INDEX: 26.13 KG/M2 | WEIGHT: 172.4 LBS | HEART RATE: 84 BPM | SYSTOLIC BLOOD PRESSURE: 121 MMHG | HEIGHT: 68 IN | DIASTOLIC BLOOD PRESSURE: 83 MMHG | TEMPERATURE: 98.1 F | OXYGEN SATURATION: 98 %

## 2021-05-17 DIAGNOSIS — Z11.1 TUBERCULOSIS SCREENING: ICD-10-CM

## 2021-05-17 DIAGNOSIS — G40.909 NONINTRACTABLE EPILEPSY WITHOUT STATUS EPILEPTICUS, UNSPECIFIED EPILEPSY TYPE (HCC): ICD-10-CM

## 2021-05-17 DIAGNOSIS — J06.9 URI (UPPER RESPIRATORY INFECTION): ICD-10-CM

## 2021-05-17 DIAGNOSIS — E78.5 HYPERLIPIDEMIA, UNSPECIFIED HYPERLIPIDEMIA TYPE: ICD-10-CM

## 2021-05-17 DIAGNOSIS — H92.01 OTALGIA OF RIGHT EAR: ICD-10-CM

## 2021-05-17 DIAGNOSIS — Z71.89 ADVANCED DIRECTIVES, COUNSELING/DISCUSSION: ICD-10-CM

## 2021-05-17 DIAGNOSIS — Z00.00 MEDICARE ANNUAL WELLNESS VISIT, SUBSEQUENT: Primary | ICD-10-CM

## 2021-05-17 DIAGNOSIS — F81.9 LEARNING DISABILITY: ICD-10-CM

## 2021-05-17 LAB
ALBUMIN SERPL-MCNC: 4.2 G/DL (ref 3.5–5)
ALBUMIN/GLOB SERPL: 1.1 {RATIO} (ref 1.1–2.2)
ALP SERPL-CCNC: 50 U/L (ref 45–117)
ALT SERPL-CCNC: 20 U/L (ref 12–78)
ANION GAP SERPL CALC-SCNC: 8 MMOL/L (ref 5–15)
AST SERPL-CCNC: 12 U/L (ref 15–37)
BASOPHILS # BLD: 0.1 K/UL (ref 0–0.1)
BASOPHILS NFR BLD: 1 % (ref 0–1)
BILIRUB SERPL-MCNC: 0.3 MG/DL (ref 0.2–1)
BUN SERPL-MCNC: 16 MG/DL (ref 6–20)
BUN/CREAT SERPL: 23 (ref 12–20)
CALCIUM SERPL-MCNC: 9.4 MG/DL (ref 8.5–10.1)
CHLORIDE SERPL-SCNC: 107 MMOL/L (ref 97–108)
CHOLEST SERPL-MCNC: 195 MG/DL
CO2 SERPL-SCNC: 28 MMOL/L (ref 21–32)
CREAT SERPL-MCNC: 0.69 MG/DL (ref 0.7–1.3)
DIFFERENTIAL METHOD BLD: NORMAL
EOSINOPHIL # BLD: 0.1 K/UL (ref 0–0.4)
EOSINOPHIL NFR BLD: 2 % (ref 0–7)
ERYTHROCYTE [DISTWIDTH] IN BLOOD BY AUTOMATED COUNT: 12.6 % (ref 11.5–14.5)
GLOBULIN SER CALC-MCNC: 3.7 G/DL (ref 2–4)
GLUCOSE SERPL-MCNC: 81 MG/DL (ref 65–100)
HCT VFR BLD AUTO: 43.7 % (ref 36.6–50.3)
HDLC SERPL-MCNC: 51 MG/DL
HDLC SERPL: 3.8 {RATIO} (ref 0–5)
HGB BLD-MCNC: 14.7 G/DL (ref 12.1–17)
IMM GRANULOCYTES # BLD AUTO: 0 K/UL (ref 0–0.04)
IMM GRANULOCYTES NFR BLD AUTO: 0 % (ref 0–0.5)
LDLC SERPL CALC-MCNC: 120 MG/DL (ref 0–100)
LYMPHOCYTES # BLD: 2.4 K/UL (ref 0.8–3.5)
LYMPHOCYTES NFR BLD: 39 % (ref 12–49)
MCH RBC QN AUTO: 30.9 PG (ref 26–34)
MCHC RBC AUTO-ENTMCNC: 33.6 G/DL (ref 30–36.5)
MCV RBC AUTO: 92 FL (ref 80–99)
MONOCYTES # BLD: 0.4 K/UL (ref 0–1)
MONOCYTES NFR BLD: 7 % (ref 5–13)
NEUTS SEG # BLD: 3 K/UL (ref 1.8–8)
NEUTS SEG NFR BLD: 51 % (ref 32–75)
NRBC # BLD: 0 K/UL (ref 0–0.01)
NRBC BLD-RTO: 0 PER 100 WBC
PLATELET # BLD AUTO: 205 K/UL (ref 150–400)
PMV BLD AUTO: 10.5 FL (ref 8.9–12.9)
POTASSIUM SERPL-SCNC: 4.2 MMOL/L (ref 3.5–5.1)
PROT SERPL-MCNC: 7.9 G/DL (ref 6.4–8.2)
RBC # BLD AUTO: 4.75 M/UL (ref 4.1–5.7)
SODIUM SERPL-SCNC: 143 MMOL/L (ref 136–145)
TRIGL SERPL-MCNC: 120 MG/DL (ref ?–150)
VALPROATE SERPL-MCNC: 77 UG/ML (ref 50–100)
VLDLC SERPL CALC-MCNC: 24 MG/DL
WBC # BLD AUTO: 6 K/UL (ref 4.1–11.1)

## 2021-05-17 PROCEDURE — G8510 SCR DEP NEG, NO PLAN REQD: HCPCS | Performed by: INTERNAL MEDICINE

## 2021-05-17 PROCEDURE — G8427 DOCREV CUR MEDS BY ELIG CLIN: HCPCS | Performed by: INTERNAL MEDICINE

## 2021-05-17 PROCEDURE — G0439 PPPS, SUBSEQ VISIT: HCPCS | Performed by: INTERNAL MEDICINE

## 2021-05-17 PROCEDURE — G8419 CALC BMI OUT NRM PARAM NOF/U: HCPCS | Performed by: INTERNAL MEDICINE

## 2021-05-17 RX ORDER — FAMOTIDINE 20 MG/1
20 TABLET, FILM COATED ORAL
Qty: 90 TAB | Refills: 1 | Status: SHIPPED | OUTPATIENT
Start: 2021-05-17 | End: 2021-09-01

## 2021-05-17 RX ORDER — LANOLIN ALCOHOL/MO/W.PET/CERES
3 CREAM (GRAM) TOPICAL
Qty: 90 TAB | Refills: 1 | Status: SHIPPED | OUTPATIENT
Start: 2021-05-17 | End: 2021-09-01

## 2021-05-17 RX ORDER — CETIRIZINE HYDROCHLORIDE 10 MG/1
1 CAPSULE, LIQUID FILLED ORAL
Qty: 90 CAP | Refills: 1 | Status: SHIPPED | OUTPATIENT
Start: 2021-05-17 | End: 2022-05-27

## 2021-05-17 RX ORDER — IBUPROFEN 200 MG
200 TABLET ORAL
Qty: 90 TAB | Refills: 1 | Status: SHIPPED | OUTPATIENT
Start: 2021-05-17 | End: 2022-05-16

## 2021-05-17 NOTE — ACP (ADVANCE CARE PLANNING)
Advance Care Planning     General Advance Care Planning (ACP) Conversation      Date of Conversation: 5/17/2021  Conducted with: Patient with Decision Making Capacity    Healthcare Decision Maker:     Click here to complete [address] including selection of the Healthcare Decision Maker Relationship (ie \"Primary\")  Today we documented Decision Maker(s). The patient will provide ACP documents. Content/Action Overview:    Has ACP document(s) NOT on file - requested patient to provide    Length of Voluntary ACP Conversation in minutes:  <16 minutes (Non-Billable)    Phoenix Sanchez MD

## 2021-05-17 NOTE — PATIENT INSTRUCTIONS
Medicare Wellness Visit, Male The best way to live healthy is to have a lifestyle where you eat a well-balanced diet, exercise regularly, limit alcohol use, and quit all forms of tobacco/nicotine, if applicable. Regular preventive services are another way to keep healthy. Preventive services (vaccines, screening tests, monitoring & exams) can help personalize your care plan, which helps you manage your own care. Screening tests can find health problems at the earliest stages, when they are easiest to treat. [facility] follows the current, evidence-based guidelines published by the [facility] ([facility]) when recommending preventive services for our patients. Because we follow these guidelines, sometimes recommendations change over time as research supports it. (For example, a prostate screening blood test is no longer routinely recommended for men with no symptoms). Of course, you and your doctor may decide to screen more often for some diseases, based on your risk and co-morbidities (chronic disease you are already diagnosed with). Preventive services for you include: - Medicare offers their members a free annual wellness visit, which is time for you and your primary care provider to discuss and plan for your preventive service needs. Take advantage of this benefit every year! 
-All adults over age 72 should receive the recommended pneumonia vaccines. Current USPSTF guidelines recommend a series of two vaccines for the best pneumonia protection.  
-All adults should have a flu vaccine yearly and tetanus vaccine every 10 years. 
-All adults age 48 and older should receive the shingles vaccines (series of two vaccines).       
-All adults age 38-68 who are overweight should have a diabetes screening test once every three years.  
-Other screening tests & preventive services for persons with diabetes include: an eye exam to screen for diabetic retinopathy, a kidney function test, a foot exam, and stricter control over your cholesterol.  
-Cardiovascular screening for adults with routine risk involves an electrocardiogram (ECG) at intervals determined by the provider.  
-Colorectal cancer screening should be done for adults age 54-65 with no increased risk factors for colorectal cancer. There are a number of acceptable methods of screening for this type of cancer. Each test has its own benefits and drawbacks. Discuss with your provider what is most appropriate for you during your annual wellness visit. The different tests include: colonoscopy (considered the best screening method), a fecal occult blood test, a fecal DNA test, and sigmoidoscopy. 
-All adults born between [facility] should be screened once for Hepatitis C. 
-An Abdominal Aortic Aneurysm (AAA) Screening is recommended for men age 73-68 who has ever smoked in their lifetime. Here is a list of your current Health Maintenance items (your personalized list of preventive services) with a due date: 
Health Maintenance Due Topic Date Due  
 Hepatitis C Test  Never done  COVID-19 Vaccine (1) Never done

## 2021-05-17 NOTE — PROGRESS NOTES
Ben Mcdermott  Identified pt with two pt identifiers(name and ). Chief Complaint   [facility]   [facility] [address] [facility]     Room . Have you been to the ER, urgent care clinic since your last visit? Hospitalized since your last visit? NO    2. Have you seen or consulted any other health care providers outside of the [address] [place] [address] since your last visit? Include any pap smears or colon screening. NO      Provider notified of reason for visit, vitals and flowsheets obtained on patients.      Patient received paperwork for advance directive during previous visit but has not completed at this time     Reviewed record In preparation for visit, huddled with provider and have obtained necessary documentation      Health Maintenance Due   Topic    Hepatitis C Screening     COVID-19 Vaccine (1)    Medicare Yearly Exam        Wt Readings from Last 3 Encounters:   21 172 lb 6.4 oz (78.2 kg)   21 173 lb (78.5 kg)   20 178 lb (80.7 kg)     Temp Readings from Last 3 Encounters:   21 98.1 °F (36.7 °C) (Oral)   21 98.1 °F (36.7 °C) (Oral)   20 98.4 °F (36.9 °C) (Oral)     BP Readings from Last 3 Encounters:   21 121/83   21 124/78   20 120/84     Pulse Readings from Last 3 Encounters:   21 84   21 89   20 88     Vitals:    21 1419   BP: 121/83   Pulse: 84   Resp: 14   Temp: 98.1 °F (36.7 °C)   TempSrc: Oral   SpO2: 98%   Weight: 172 lb 6.4 oz (78.2 kg)   Height: 5' 8\" (1.727 m)   PainSc:   0 - No pain         Learning Assessment:  :     Learning Assessment 4/15/2019   PRIMARY LEARNER Mother   PRIMARY LANGUAGE ENGLISH   LEARNER PREFERENCE PRIMARY DEMONSTRATION   ANSWERED BY Mother   RELATIONSHIP OTHER       Depression Screening:  :     3 most recent [facility] Screens 2021   Little interest or pleasure in doing things Not at all   Feeling down, depressed, irritable, or hopeless Not at all   Total Score PHQ 2 0   Trouble falling or staying asleep, or sleeping too much -   Feeling tired or having little energy -   Poor appetite, weight loss, or overeating -   Feeling bad about yourself - or that you are a failure or have let yourself or your family down -   Trouble concentrating on things such as school, work, reading, or watching TV -   Moving or speaking so slowly that other people could have noticed; or the opposite being so fidgety that others notice -   Thoughts of being better off dead, or hurting yourself in some way -   PHQ 9 Score -   How difficult have these problems made it for you to do your work, take care of your home and get along with others -       Fall Risk Assessment:  :     Fall Risk Assessment, last 12 mths 3/12/2021   Able to walk? Yes   Fall in past 12 months? 0   Do you feel unsteady? 0   Are you worried about falling 0       Abuse Screening:  :     Abuse Screening Questionnaire 3/12/2021 4/28/2020 2/14/2020 4/15/2019   Do you ever feel afraid of your partner? N N N N   Are you in a relationship with someone who physically or mentally threatens you? N N N N   Is it safe for you to go home? Y Y Y Y       ADL Screening:  :     No flowsheet data found. Medication reconciliation up to date and corrected with patient at this time.

## 2021-05-17 NOTE — LETTER
5/17/2021 2:59 PM 
 
Mr. Valeria Kim [address] [address] To whom it may concern: 
  
Valeria Delgadillo was evaluated today for a full physical exam. Gustavo Berger is my opinion after this evaluation that he is desiring residence with his mother (under the sponsored residential program).  I am in agreement and see no contraindications to that plan. 
  
  
 
 
Sincerely, Bonifacio Barros MD

## 2021-05-17 NOTE — PROGRESS NOTES
Parshant Avila is a 31 y.o. male who presents today for Annual Wellness Visit (Room 21// )  . He has a history of   Patient Active Problem List   Diagnosis Code    Epilepsy ([facility] G40.909    Acne vulgaris L70.0    Kidney stone N20.0    GERD (gastroesophageal reflux disease) K21.9    History of nephrolithiasis Z87.442    Learning disability F81.9   . Today patient is here for follow-up and complete physical exam.. Epilepsy: Patient  has remained on valproic acid.  Levels were checked last year which were normal. Has not had any seizures in 7+ years. Has seen Dr. Vashti Horne in the past.      Has had a lifelong learning disability, but is very independent overall.     Remains on omeprazole for his reflux disease. Reports that this has been having some bloating. We discussed this may be a dairy allergy. Has had a lifelong learning disability, but is very independent overall.     History of kidney stone: last in 2018. Has had several in his life.      Social: Lives at home with mother.                QNX one sister with two children.                [place] in [place].                No EtOH or Tobacco.                Very active at work. Cancer screening: N/A    Immunizations:     Immunization History   Administered Date(s) Administered    Influenza Vaccine LIVELENZ) PF (>6 Mo Flulaval, Fluarix, and >3 Yrs Afluria, Fluzone 96744) 02/14/2020    TB Skin Test (PPD) Intradermal 09/17/2013, 04/30/2018    Tdap 08/01/2013      Immunization status: Discussed COVID vaccination. ROS  Review of Systems   Constitutional: Negative for chills, fever and weight loss. HENT: Negative for congestion and sore throat. Eyes: Negative for blurred vision, double vision and photophobia. Respiratory: Negative for cough and shortness of breath. Cardiovascular: Negative for chest pain, palpitations and leg swelling.    Gastrointestinal: Negative for abdominal pain, constipation, diarrhea, heartburn, nausea and vomiting. Occasional bloating   Genitourinary: Negative for dysuria, frequency and urgency. Musculoskeletal: Negative for joint pain and myalgias. Skin: Negative for rash. Neurological: Negative. Negative for headaches. Endo/Heme/Allergies: Does not bruise/bleed easily. Psychiatric/Behavioral: Negative for memory loss and suicidal ideas. Visit Vitals  /83 (BP 1 Location: Left upper arm, BP Patient Position: Sitting)   Pulse 84   Temp 98.1 °F (36.7 °C) (Oral)   Resp 14   Ht 5' 8\" (1.727 m)   Wt 172 lb 6.4 oz (78.2 kg)   SpO2 98%   BMI 26.21 kg/m²       Physical Exam  Constitutional:       Appearance: He is well-developed. He is not diaphoretic. HENT:      Head: Normocephalic and atraumatic. Eyes:      Pupils: Pupils are equal, round, and reactive to light. Neck:      Musculoskeletal: Normal range of motion and neck supple. Vascular: No JVD. Cardiovascular:      Rate and Rhythm: Normal rate and regular rhythm. Heart sounds: No murmur. Pulmonary:      Effort: Pulmonary effort is normal. No respiratory distress. Breath sounds: Normal breath sounds. No wheezing. Abdominal:      General: Bowel sounds are normal. There is no distension. Palpations: Abdomen is soft. Tenderness: There is no abdominal tenderness. Musculoskeletal: Normal range of motion. Skin:     General: Skin is warm and dry. Neurological:      Mental Status: He is alert and oriented to person, place, and time. Cranial Nerves: No cranial nerve deficit. Psychiatric:         Behavior: Behavior normal.           Current Outpatient Medications   Medication Sig    mometasone (Nasonex) 50 mcg/actuation nasal spray 2 Sprays by Both Nostrils route daily. As needed.  famotidine (PEPCID) 20 mg tablet Take 20 mg by mouth.  omeprazole (PRILOSEC) 40 mg capsule Take 1 Cap by mouth daily.  divalproex DR (DEPAKOTE) 250 mg tablet Take 3 Tabs by mouth daily.     melatonin 3 mg tablet Take 3 mg by mouth as needed.  Cetirizine (ZYRTEC) 10 mg cap Take  by mouth daily as needed.  ibuprofen (MOTRIN) 200 mg tablet Take 200 mg by mouth every six (6) hours as needed for Pain. No current facility-administered medications for this visit. Past Medical History:   Diagnosis Date    Epilepsy [facility])     started age 5     GERD (gastroesophageal reflux disease)     Intellectual disability     Kidney stone     Psychiatric disorder     mood disorder      No past surgical history on file. Social History     Tobacco Use    Smoking status: Never Smoker    Smokeless tobacco: Never Used   Substance Use Topics    Alcohol use: No     Comment: no       Family History   Problem Relation Age of Onset    Diabetes Mother     Hypertension Mother     Hypertension Father     Heart Disease Father         cardiomyopathy    Cancer Father         prostate    Heart Disease Maternal Grandmother     COPD Maternal Grandmother     Stroke Maternal Grandfather     Seizures Neg Hx         Allergies   Allergen Reactions    Contrast Agent [Iodine] Itching    Vicodin [Hydrocodone-Acetaminophen] Other (comments)     Mother states that Vicodin makes patient very mean. Assessment/Plan  Diagnoses and all orders for this visit:    1. Medicare annual wellness visit, subsequent  -     METABOLIC PANEL, COMPREHENSIVE; Future  -     CBC WITH AUTOMATED DIFF; Future  -     VALPROIC ACID; Future  -     LIPID PANEL; Future    2. Nonintractable epilepsy without status epilepticus, unspecified epilepsy type ([facility]  -     METABOLIC PANEL, COMPREHENSIVE; Future  -     CBC WITH AUTOMATED DIFF; Future  -     VALPROIC ACID; Future  -     LIPID PANEL; Future    3. Learning disability    4. Advanced directives, counseling/discussion    5. Tuberculosis screening  -     QUANTIFERON-TB GOLD PLUS; Future    6. Otalgia of right ear  -     ibuprofen (MOTRIN) 200 mg tablet;  Take 1 Tab by mouth every six (6) hours as needed for Pain. 7. URI (upper respiratory infection)  -     ibuprofen (MOTRIN) 200 mg tablet; Take 1 Tab by mouth every six (6) hours as needed for Pain. 8. Hyperlipidemia, unspecified hyperlipidemia type  -     LIPID PANEL; Future    Other orders  -     Cetirizine (ZyrTEC) 10 mg cap; Take 1 Tab by mouth daily as needed (allergies). Take  by mouth daily as needed. -     famotidine (Pepcid) 20 mg tablet; Take 1 Tab by mouth nightly. -     melatonin 3 mg tablet; Take 1 Tab by mouth nightly. Madhavi Cruz MD  5/17/2021    This note was created with the help of speech recognition software [employer]) and may contain some 'sound alike' errors. This is the Subsequent Medicare Annual Wellness Exam, performed 12 months or more after the Initial AWV or the last Subsequent AWV    I have reviewed the patient's medical history in detail and updated the computerized patient record. Assessment/Plan   Education and counseling provided:  Are appropriate based on today's review and evaluation  End-of-Life planning (with patient's consent)  Cardiovascular screening blood test    1. Nonintractable epilepsy without status epilepticus, unspecified epilepsy type ([facility]  2. Learning disability  3. Medicare annual wellness visit, subsequent  4.  Advanced directives, counseling/discussion       Depression Risk Factor Screening     3 most recent PHQ Screens 5/17/2021   Little interest or pleasure in doing things Not at all   Feeling down, depressed, irritable, or hopeless Not at all   Total Score PHQ 2 0   Trouble falling or staying asleep, or sleeping too much -   Feeling tired or having little energy -   Poor appetite, weight loss, or overeating -   Feeling bad about yourself - or that you are a failure or have let yourself or your family down -   Trouble concentrating on things such as school, work, reading, or watching TV -   Moving or speaking so slowly that other people could have noticed; or the opposite being so fidgety that others notice -   Thoughts of being better off dead, or hurting yourself in some way -   PHQ 9 Score -   How difficult have these problems made it for you to do your work, take care of your home and get along with others -       Alcohol Risk Screen    Do you average more than 2 drinks per night or 14 drinks a week: No    On any one occasion in the past three months have you have had more than 4 drinks containing alcohol:  No        Functional Ability and Level of Safety    Hearing: Hearing is good. Activities of Daily Living: The home contains: no safety equipment. Patient does total self care      Ambulation: with no difficulty     Fall Risk:  Fall Risk Assessment, last 12 mths 3/12/2021   Able to walk? Yes   Fall in past 12 months? 0   Do you feel unsteady? 0   Are you worried about falling 0      Abuse Screen:  Patient is not abused       Cognitive Screening    Has your family/caregiver stated any concerns about your memory: Cognitive function is stable. Health Maintenance Due     Health Maintenance Due   Topic Date Due    Hepatitis C Screening  Never done    COVID-19 Vaccine (1) Never done       Patient Care Team   Patient Care Team:  Larry Pichardo MD as PCP - General (Internal Medicine)  Larry Pichardo MD as PCP - [facility]  Natasha Rivera MD (Sleep Medicine)    History     Patient Active Problem List   Diagnosis Code    Epilepsy ([facility] G40.909    Acne vulgaris L70.0    Kidney stone N20.0    GERD (gastroesophageal reflux disease) K21.9    History of nephrolithiasis Z87.442    Learning disability F81.9     Past Medical History:   Diagnosis Date    Epilepsy ([facility]     started age 5    [facility] GERD (gastroesophageal reflux disease)     Intellectual disability     Kidney stone     Psychiatric disorder     mood disorder      No past surgical history on file.   Current Outpatient Medications   Medication Sig Dispense Refill    mometasone (Nasonex) 50 mcg/actuation nasal spray 2 Sprays by Both Nostrils route daily. As needed. 2 Container 2    famotidine (PEPCID) 20 mg tablet Take 20 mg by mouth.  omeprazole (PRILOSEC) 40 mg capsule Take 1 Cap by mouth daily. 30 Cap 3    divalproex DR (DEPAKOTE) 250 mg tablet Take 3 Tabs by mouth daily. 1 Tab 1    melatonin 3 mg tablet Take 3 mg by mouth as needed.  Cetirizine (ZYRTEC) 10 mg cap Take  by mouth daily as needed.  ibuprofen (MOTRIN) 200 mg tablet Take 200 mg by mouth every six (6) hours as needed for Pain. Allergies   Allergen Reactions    Contrast Agent [Iodine] Itching    Vicodin [Hydrocodone-Acetaminophen] Other (comments)     Mother states that Vicodin makes patient very mean.         Family History   Problem Relation Age of Onset    Diabetes Mother     Hypertension Mother     Hypertension Father     Heart Disease Father         cardiomyopathy    Cancer Father         prostate    Heart Disease Maternal Grandmother     COPD Maternal Grandmother     Stroke Maternal Grandfather     Seizures Neg Hx      Social History     Tobacco Use    Smoking status: Never Smoker    Smokeless tobacco: Never Used   Substance Use Topics    Alcohol use: No     Comment: no          Zenaida Olivier MD

## 2021-05-21 LAB
M TB IFN-G BLD-IMP: NEGATIVE
QUANTIFERON CRITERIA, QFI1T: NORMAL
QUANTIFERON INCUBATION, QF1T: NORMAL
QUANTIFERON MITOGEN VALUE: >10 IU/ML
QUANTIFERON NIL VALUE: 0 IU/ML
QUANTIFERON TB1 AG: 0 IU/ML
QUANTIFERON TB2 AG: 0 IU/ML

## 2021-09-01 RX ORDER — LANOLIN ALCOHOL/MO/W.PET/CERES
CREAM (GRAM) TOPICAL
Qty: 30 TABLET | Refills: 0 | Status: SHIPPED | OUTPATIENT
Start: 2021-09-01 | End: 2021-10-26

## 2021-09-01 RX ORDER — FAMOTIDINE 20 MG/1
TABLET, FILM COATED ORAL
Qty: 30 TABLET | Refills: 0 | Status: SHIPPED | OUTPATIENT
Start: 2021-09-01 | End: 2021-10-01

## 2021-10-01 RX ORDER — DIVALPROEX SODIUM 250 MG/1
TABLET, DELAYED RELEASE ORAL
Qty: 90 TABLET | Refills: 1 | Status: SHIPPED | OUTPATIENT
Start: 2021-10-01 | End: 2021-12-23

## 2021-10-01 RX ORDER — FAMOTIDINE 20 MG/1
TABLET, FILM COATED ORAL
Qty: 30 TABLET | Refills: 1 | Status: SHIPPED | OUTPATIENT
Start: 2021-10-01 | End: 2021-12-23

## 2021-10-01 NOTE — TELEPHONE ENCOUNTER
Pt's mom verify that they still have enough med supply. Pharmacy informed that the refill request was the default value and requested rx for 30 days for November. 30 days supply sent in.

## 2022-03-09 DIAGNOSIS — H66.91 OTITIS OF RIGHT EAR: ICD-10-CM

## 2022-03-09 RX ORDER — MOMETASONE FUROATE 50 UG/1
SPRAY, METERED NASAL
Qty: 1 EACH | Refills: 4 | Status: SHIPPED | OUTPATIENT
Start: 2022-03-09 | End: 2022-05-27 | Stop reason: SDUPTHER

## 2022-03-18 PROBLEM — Z87.442 HISTORY OF NEPHROLITHIASIS: Status: ACTIVE | Noted: 2020-02-14

## 2022-03-19 PROBLEM — F81.9 LEARNING DISABILITY: Status: ACTIVE | Noted: 2020-02-14

## 2022-04-08 DIAGNOSIS — K21.9 GASTROESOPHAGEAL REFLUX DISEASE: ICD-10-CM

## 2022-04-08 RX ORDER — OMEPRAZOLE 40 MG/1
CAPSULE, DELAYED RELEASE ORAL
Qty: 30 CAPSULE | Refills: 4 | Status: SHIPPED | OUTPATIENT
Start: 2022-04-08 | End: 2022-08-30

## 2022-04-08 RX ORDER — LANOLIN ALCOHOL/MO/W.PET/CERES
CREAM (GRAM) TOPICAL
Qty: 30 TABLET | Refills: 4 | Status: SHIPPED | OUTPATIENT
Start: 2022-04-08 | End: 2022-08-30

## 2022-04-28 ENCOUNTER — TELEPHONE (OUTPATIENT)
Dept: INTERNAL MEDICINE CLINIC | Age: 32
End: 2022-04-28

## 2022-04-28 NOTE — TELEPHONE ENCOUNTER
----- Message from Jakob Client sent at 4/27/2022  1:59 PM EDT -----  Subject: Appointment Request    Reason for Call: Routine Medicare AWV    QUESTIONS  Type of Appointment? Established Patient  Reason for appointment request? No appointments available during search  Additional Information for Provider? patient is needing an AWV scheduled   prior to his ISP being affective on June 1st. please advise   ---------------------------------------------------------------------------  --------------  CALL BACK INFO  What is the best way for the office to contact you? OK to leave message on   voicemail  Preferred Call Back Phone Number? [phone]  ---------------------------------------------------------------------------  --------------  SCRIPT ANSWERS  Relationship to Patient? Parent  Representative Name? Sumaya Aguirre  Additional information verified (besides Name and Date of Birth)? Phone   Number  (If the patient has Medicare as their primary insurance coverage ask this   question) Are you requesting a Medicare Annual Wellness Visit? Yes   Have you been diagnosed with, awaiting test results for, or told that you   are suspected of having COVID-19 (Coronavirus)? (If patient has tested   negative or was tested as a requirement for work, school, or travel and   not based on symptoms, answer no)? No  Within the past 10 days have you developed any of the following symptoms   (answer no if symptoms have been present longer than 10 days or began   more than 10 days ago)? Fever or Chills, Cough, Shortness of breath or   difficulty breathing, Loss of taste or smell, Sore throat, Nasal   congestion, Sneezing or runny nose, Fatigue or generalized body aches   (answer no if pain is specific to a body part e.g. back pain), Diarrhea,   Headache? No  Have you had close contact with someone with COVID-19 in the last 7 days? No  (Service Expert  click yes below to proceed with [facility] As [facility]ual   Scheduling)?  Yes

## 2022-05-16 DIAGNOSIS — H92.01 OTALGIA OF RIGHT EAR: ICD-10-CM

## 2022-05-16 DIAGNOSIS — J06.9 URI (UPPER RESPIRATORY INFECTION): ICD-10-CM

## 2022-05-16 RX ORDER — IBUPROFEN 200 MG
TABLET ORAL
Qty: 30 TABLET | Refills: 4 | Status: SHIPPED | OUTPATIENT
Start: 2022-05-16

## 2022-05-16 RX ORDER — CETIRIZINE HCL 10 MG
TABLET ORAL
Qty: 30 TABLET | Refills: 4 | Status: SHIPPED | OUTPATIENT
Start: 2022-05-16

## 2022-05-26 NOTE — PROGRESS NOTES
Rena Correa is a 36 y.o. male who presents today for Annual Wellness Visit (RM19// pt presents today for annual [address]; having some upper arm/muscle aches that goes up into shoulders)  . He has a history of   Patient Active Problem List   Diagnosis Code    [facility] G40.909    Acne vulgaris L70.0    Kidney stone N20.0    GERD (gastroesophageal reflux disease) K21.9    History of nephrolithiasis Z87.442    Learning disability F81.9   . Today patient is here for CPE. Having more arm/back/neck pain. Worse after a busy day. Very active at work. Epilepsy: Patient  has remained on valproic acid.  Levels were checked last year which were normal. Has not had any seizures in 7+ years. Has seen Dr. Luis Chery in the past.      Has had a lifelong learning disability, but is very independent overall.      Remains on omeprazole for his reflux disease. Reports that this has been having some bloating. We discussed this may be a dairy allergy. Has had a lifelong learning disability, but is very independent overall. Weight is stable from last year. Overall down from 2017.     History of kidney stone: last in 2018. Has had several in his life.      Social: Lives at home with mother.                DNV one sister with two children.                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                 No EtOH or Tobacco.                Very active at work. Cancer screening: N/A    Immunizations:     Immunization History   Administered Date(s) Administered    COVID-19, Pfizer Purple top, DILUTE for use, 12+ yrs, 30mcg/0.3mL dose 07/30/2021, 08/20/2021    Influenza Vaccine [employer]) PF (>6 Mo Flulaval, Fluarix, and >3 Yrs Afluria, Fluzone 50187) 02/14/2020    TB Skin Test (PPD) Intradermal 09/17/2013, 04/30/2018    Tdap 08/01/2013      Immunization status: up to date and documented.       ROS  Review of Systems   Constitutional: Negative for chills, fever and weight loss.   HENT: Negative for congestion and sore throat. Eyes: Negative for blurred vision, double vision and photophobia. Respiratory: Negative for cough and shortness of breath. Cardiovascular: Negative for chest pain, palpitations and leg swelling. Gastrointestinal: Negative for abdominal pain, constipation, diarrhea, heartburn, nausea and vomiting. Genitourinary: Negative for dysuria, frequency and urgency. Musculoskeletal: Positive for back pain, myalgias and neck pain. Negative for joint pain. Skin: Negative for rash. Neurological: Negative. Negative for headaches. Endo/Heme/Allergies: Does not bruise/bleed easily. Psychiatric/Behavioral: Negative for memory loss and suicidal ideas. Visit Vitals  /85 (BP 1 Location: Left upper arm, BP Patient Position: Sitting, BP Cuff Size: Large adult)   Pulse 96   Temp 98.2 °F (36.8 °C) (Oral)   Resp 16   Ht 5' 8\" (1.727 m)   Wt 173 lb (78.5 kg)   SpO2 98%   BMI 26.30 kg/m²       Physical Exam  Constitutional:       Appearance: He is well-developed. He is not diaphoretic. HENT:      Head: Normocephalic and atraumatic. Eyes:      Pupils: Pupils are equal, round, and reactive to light. Neck:      Vascular: No JVD. Cardiovascular:      Rate and Rhythm: Normal rate and regular rhythm. Heart sounds: No murmur heard. Pulmonary:      Effort: Pulmonary effort is normal. No respiratory distress. Breath sounds: Normal breath sounds. No wheezing. Abdominal:      General: Bowel sounds are normal. There is no distension. Palpations: Abdomen is soft. Tenderness: There is no abdominal tenderness. Musculoskeletal:         General: Normal range of motion. Cervical back: Normal range of motion and neck supple. Comments: Normal UE strength. No muscle wasting. Lipoma just to the L of cervical spine. Skin:     General: Skin is warm and dry.    Neurological:      Mental Status: He is alert and oriented to person, place, and time. Cranial Nerves: No cranial nerve deficit. Psychiatric:         Behavior: Behavior normal.           Current Outpatient Medications   Medication Sig    ibuprofen (MOTRIN) 200 mg tablet 1 TABLET BY MOUTH EVERY 6 HOURS AS NEEDED FOR PAIN    cetirizine (ZYRTEC) 10 mg tablet 1 TABLET BY MOUTH EVERY DAY AS NEEDED FOR ALLERGIES    melatonin 3 mg tablet 1 TABLET BY MOUTH AT BEDTIME FOR SLEEP    omeprazole (PRILOSEC) 40 mg capsule 1 CAPSULE BY MOUTH EVERY DAY    mometasone (NASONEX) 50 mcg/actuation nasal spray INSTILL 2 SPRAYS INTO BOTH NOSTRILS EVERY DAY AS NEEDED    famotidine (PEPCID) 20 mg tablet 1 TABLET BY MOUTH AT BEDTIME FOR GERD    divalproex DR (DEPAKOTE) 250 mg tablet 3 TABLETS (750MG) BY MOUTH AT BEDTIME     No current facility-administered medications for this visit. Past Medical History:   Diagnosis Date    Epilepsy [facility])     started age 5     GERD (gastroesophageal reflux disease)     Intellectual disability     Kidney stone     Psychiatric disorder     mood disorder      No past surgical history on file. Social History     Tobacco Use    Smoking status: Never Smoker    Smokeless tobacco: Never Used   Substance Use Topics    Alcohol use: No     Comment: no       Family History   Problem Relation Age of Onset    Diabetes Mother     Hypertension Mother     Hypertension Father     Heart Disease Father         cardiomyopathy    Cancer Father         prostate    Heart Disease Maternal Grandmother     COPD Maternal Grandmother     Stroke Maternal Grandfather     Seizures Neg Hx         Allergies   Allergen Reactions    Contrast Agent [Iodine] Itching    Vicodin [Hydrocodone-Acetaminophen] Other (comments)     Mother states that Vicodin makes patient very mean. Assessment/Plan  Diagnoses and all orders for this visit:    1. Medicare annual wellness visit, subsequent- . Miguel A Schulz was counseled on age-appropriate/ guideline-based risk prevention behaviors and screening for a 36y.o. year old   male . We also discussed adjustments in screening based on family history if necessary. Printed instructions for preventative screening guidelines and healthy behaviors given to patient with after visit summary.      -     CBC WITH AUTOMATED DIFF; Future  -     METABOLIC PANEL, COMPREHENSIVE; Future    2. Nonintractable epilepsy without status epilepticus, unspecified epilepsy type ([facility]- stable  -     CBC WITH AUTOMATED DIFF; Future  -     METABOLIC PANEL, COMPREHENSIVE; Future  -     VALPROIC ACID; Future    3. Learning disability    4. Tuberculosis screening  -     QUANTIFERON-TB GOLD PLUS; Future    5. Upper extremity pain, inferior, unspecified laterality- Likely MS from working. 14 day course of NSAID and provided with exercises to do regularly. -     QUANTIFERON-TB GOLD PLUS; Future  -     diclofenac EC (VOLTAREN) 75 mg EC tablet; Take 1 Tablet by mouth two (2) times a day for 14 days.  -     CK; Future    6. Lipoma of neck- unlikely causing #5  -     QUANTIFERON-TB GOLD PLUS; Future  -     diclofenac EC (VOLTAREN) 75 mg EC tablet; Take 1 Tablet by mouth two (2) times a day for 14 days.  -     CK; Future    7. Otitis of right ear- refill.   -     mometasone (NASONEX) 50 mcg/actuation nasal spray; INSTILL 2 SPRAYS INTO BOTH NOSTRILS EVERY DAY AS NEEDED            Maik Wan MD  5/27/2022    This note was created with the help of speech recognition software Ursula Packer) and may contain some 'sound alike' errors. This is the Subsequent Medicare Annual Wellness Exam, performed 12 months or more after the Initial AWV or the last Subsequent AWV    I have reviewed the patient's medical history in detail and updated the computerized patient record. Assessment/Plan   Education and counseling provided:  Are appropriate based on today's review and evaluation  End-of-Life planning (with patient's consent)  Colorectal cancer screening tests    1. Learning disability  2. Nonintractable epilepsy without status epilepticus, unspecified epilepsy type ([facility]  3. Medicare annual wellness visit, subsequent       Depression Risk Factor Screening     3 most recent PHQ Screens 5/27/2022   Little interest or pleasure in doing things Not at all   Feeling down, depressed, irritable, or hopeless Not at all   Total Score PHQ 2 0   Trouble falling or staying asleep, or sleeping too much -   Feeling tired or having little energy -   Poor appetite, weight loss, or overeating -   Feeling bad about yourself - or that you are a failure or have let yourself or your family down -   Trouble concentrating on things such as school, work, reading, or watching TV -   Moving or speaking so slowly that other people could have noticed; or the opposite being so fidgety that others notice -   Thoughts of being better off dead, or hurting yourself in some way -   PHQ 9 Score -   How difficult have these problems made it for you to do your work, take care of your home and get along with others -       Alcohol & Drug Abuse Risk Screen    Do you average more than 2 drinks per night or 14 drinks a week: No    On any one occasion in the past three months have you have had more than 4 drinks containing alcohol:  No          Functional Ability and Level of Safety    Hearing: Hearing is good. Activities of Daily Living: The home contains: no safety equipment. Patient does total self care      Ambulation: with no difficulty     Fall Risk:  Fall Risk Assessment, last 12 mths 5/27/2022   Able to walk? Yes   Fall in past 12 months? 0   Do you feel unsteady?  0   Are you worried about falling 0      Abuse Screen:  Patient is not abused       Cognitive Screening    Has your family/caregiver stated any concerns about your memory: no      Health Maintenance Due     Health Maintenance Due   Topic Date Due    COVID-19 Vaccine (3 - Booster for Lopez Peter series) 01/20/2022       Patient Care Team   Patient Care Team:  Olivia Flynn MD as PCP - General (Internal Medicine Physician)  Olivia Flynn MD as PCP - [facility]  Charlie Willett MD (Sleep Medicine Physician)    History     Patient Active Problem List   Diagnosis Code    Epilepsy [facility]) G40.909    Acne vulgaris L70.0    Kidney stone N20.0    GERD (gastroesophageal reflux disease) K21.9    History of nephrolithiasis Z87.442    Learning disability F81.9     Past Medical History:   Diagnosis Date    Epilepsy ([facility]     started age 5    [place] GERD (gastroesophageal reflux disease)     Intellectual disability     Kidney stone     Psychiatric disorder     mood disorder      No past surgical history on file. Current Outpatient Medications   Medication Sig Dispense Refill    ibuprofen (MOTRIN) 200 mg tablet 1 TABLET BY MOUTH EVERY 6 HOURS AS NEEDED FOR PAIN 30 Tablet 4    cetirizine (ZYRTEC) 10 mg tablet 1 TABLET BY MOUTH EVERY DAY AS NEEDED FOR ALLERGIES 30 Tablet 4    melatonin 3 mg tablet 1 TABLET BY MOUTH AT BEDTIME FOR SLEEP 30 Tablet 4    omeprazole (PRILOSEC) 40 mg capsule 1 CAPSULE BY MOUTH EVERY DAY 30 Capsule 4    mometasone (NASONEX) 50 mcg/actuation nasal spray INSTILL 2 SPRAYS INTO BOTH NOSTRILS EVERY DAY AS NEEDED 1 Each 4    famotidine (PEPCID) 20 mg tablet 1 TABLET BY MOUTH AT BEDTIME FOR GERD 30 Tablet 5    divalproex DR (DEPAKOTE) 250 mg tablet 3 TABLETS (750MG) BY MOUTH AT BEDTIME 90 Tablet 5     Allergies   Allergen Reactions    Contrast Agent [Iodine] Itching    Vicodin [Hydrocodone-Acetaminophen] Other (comments)     Mother states that Vicodin makes patient very mean.         Family History   Problem Relation Age of Onset    Diabetes Mother     Hypertension Mother     Hypertension Father     Heart Disease Father         cardiomyopathy    Cancer Father         prostate    Heart Disease Maternal Grandmother     COPD Maternal Grandmother     Stroke Maternal Grandfather     Seizures Neg Hx      Social History     Tobacco Use    Smoking status: Never Smoker    Smokeless tobacco: Never Used   Substance Use Topics    Alcohol use: No     Comment: no          Anai Melendez MD

## 2022-05-27 ENCOUNTER — OFFICE VISIT (OUTPATIENT)
Dept: INTERNAL MEDICINE CLINIC | Age: 32
End: 2022-05-27
Payer: MEDICARE

## 2022-05-27 VITALS
HEIGHT: 68 IN | SYSTOLIC BLOOD PRESSURE: 116 MMHG | OXYGEN SATURATION: 98 % | BODY MASS INDEX: 26.22 KG/M2 | WEIGHT: 173 LBS | RESPIRATION RATE: 16 BRPM | HEART RATE: 96 BPM | TEMPERATURE: 98.2 F | DIASTOLIC BLOOD PRESSURE: 85 MMHG

## 2022-05-27 DIAGNOSIS — G40.909 NONINTRACTABLE EPILEPSY WITHOUT STATUS EPILEPTICUS, UNSPECIFIED EPILEPSY TYPE (HCC): ICD-10-CM

## 2022-05-27 DIAGNOSIS — F81.9 LEARNING DISABILITY: ICD-10-CM

## 2022-05-27 DIAGNOSIS — H66.91 OTITIS OF RIGHT EAR: ICD-10-CM

## 2022-05-27 DIAGNOSIS — M79.603 UPPER EXTREMITY PAIN, INFERIOR, UNSPECIFIED LATERALITY: ICD-10-CM

## 2022-05-27 DIAGNOSIS — D17.0 LIPOMA OF NECK: ICD-10-CM

## 2022-05-27 DIAGNOSIS — Z00.00 MEDICARE ANNUAL WELLNESS VISIT, SUBSEQUENT: Primary | ICD-10-CM

## 2022-05-27 DIAGNOSIS — Z11.1 TUBERCULOSIS SCREENING: ICD-10-CM

## 2022-05-27 PROCEDURE — G0439 PPPS, SUBSEQ VISIT: HCPCS | Performed by: INTERNAL MEDICINE

## 2022-05-27 PROCEDURE — G8510 SCR DEP NEG, NO PLAN REQD: HCPCS | Performed by: INTERNAL MEDICINE

## 2022-05-27 PROCEDURE — G8427 DOCREV CUR MEDS BY ELIG CLIN: HCPCS | Performed by: INTERNAL MEDICINE

## 2022-05-27 PROCEDURE — G8419 CALC BMI OUT NRM PARAM NOF/U: HCPCS | Performed by: INTERNAL MEDICINE

## 2022-05-27 RX ORDER — MOMETASONE FUROATE 50 UG/1
SPRAY, METERED NASAL
Qty: 1 EACH | Refills: 4 | Status: SHIPPED | OUTPATIENT
Start: 2022-05-27

## 2022-05-27 RX ORDER — DICLOFENAC SODIUM 75 MG/1
75 TABLET, DELAYED RELEASE ORAL 2 TIMES DAILY
Qty: 28 TABLET | Refills: 0 | Status: SHIPPED | OUTPATIENT
Start: 2022-05-27 | End: 2022-06-10

## 2022-05-27 NOTE — PROGRESS NOTES
Jeramy Noel  Identified pt with two pt identifiers(name and ). Chief Complaint   Patient presents with    Annual Wellness Visit     RM19// pt presents today for annual [address]; having some upper arm/muscle aches that goes up into shoulders       1. Have you been to the ER, urgent care clinic since your last visit? Hospitalized since your last visit? NO    2. Have you seen or consulted any other health care providers outside of the [address] [place] [address] since your last visit? Include any pap smears or colon screening. NO      Provider notified of reason for visit, vitals and flowsheets obtained on patients.      Patient received paperwork for advance directive during previous visit but has not completed at this time     Reviewed record In preparation for visit, huddled with provider and have obtained necessary documentation      Health Maintenance Due   Topic    COVID-19 Vaccine (3 - Booster for Pfizer series)    Depression Screen     Medicare Yearly Exam        Wt Readings from Last 3 Encounters:   22 173 lb (78.5 kg)   21 172 lb 6.4 oz (78.2 kg)   21 173 lb (78.5 kg)     Temp Readings from Last 3 Encounters:   22 98.2 °F (36.8 °C) (Oral)   21 98.1 °F (36.7 °C) (Oral)   21 98.1 °F (36.7 °C) (Oral)     BP Readings from Last 3 Encounters:   22 116/85   21 121/83   21 124/78     Pulse Readings from Last 3 Encounters:   22 96   21 84   21 89     Vitals:    22 0754   BP: 116/85   Pulse: 96   Resp: 16   Temp: 98.2 °F (36.8 °C)   TempSrc: Oral   SpO2: 98%   Weight: 173 lb (78.5 kg)   Height: 5' 8\" (1.727 m)   PainSc:   0 - No pain         Learning Assessment:  :     Learning Assessment 4/15/2019   PRIMARY LEARNER Mother   PRIMARY LANGUAGE ENGLISH   LEARNER PREFERENCE PRIMARY DEMONSTRATION   ANSWERED BY Mother   RELATIONSHIP OTHER       Depression Screening:  :     3 most recent PHQ Screens 2022   Little interest or pleasure in doing things Not at all   Feeling down, depressed, irritable, or hopeless Not at all   Total Score PHQ 2 0   Trouble falling or staying asleep, or sleeping too much -   Feeling tired or having little energy -   Poor appetite, weight loss, or overeating -   Feeling bad about yourself - or that you are a failure or have let yourself or your family down -   Trouble concentrating on things such as school, work, reading, or watching TV -   Moving or speaking so slowly that other people could have noticed; or the opposite being so fidgety that others notice -   Thoughts of being better off dead, or hurting yourself in some way -   PHQ 9 Score -   How difficult have these problems made it for you to do your work, take care of your home and get along with others -       Fall Risk Assessment:  :     Fall Risk Assessment, last 12 mths 5/27/2022   Able to walk? Yes   Fall in past 12 months? 0   Do you feel unsteady? 0   Are you worried about falling 0       Abuse Screening:  :     Abuse Screening Questionnaire 3/12/2021 4/28/2020 2/14/2020 4/15/2019   Do you ever feel afraid of your partner? N N N N   Are you in a relationship with someone who physically or mentally threatens you? N N N N   Is it safe for you to go home? Y Y Y Y       ADL Screening:  :     No flowsheet data found. Medication reconciliation up to date and corrected with patient at this time.

## 2022-05-27 NOTE — PATIENT INSTRUCTIONS
Medicare Wellness Visit, Male    The best way to live healthy is to have a lifestyle where you eat a well-balanced diet, exercise regularly, limit alcohol use, and quit all forms of tobacco/nicotine, if applicable. Regular preventive services are another way to keep healthy. Preventive services (vaccines, screening tests, monitoring & exams) can help personalize your care plan, which helps you manage your own care. Screening tests can find health problems at the earliest stages, when they are easiest to treat. [facility] follows the current, evidence-based guidelines published by the [facility] Samy Brenna ([facility]) when recommending preventive services for our patients. Because we follow these guidelines, sometimes recommendations change over time as research supports it. (For example, a prostate screening blood test is no longer routinely recommended for men with no symptoms). Of course, you and your doctor may decide to screen more often for some diseases, based on your risk and co-morbidities (chronic disease you are already diagnosed with). Preventive services for you include:  - Medicare offers their members a free annual wellness visit, which is time for you and your primary care provider to discuss and plan for your preventive service needs. Take advantage of this benefit every year!  -All adults over age 72 should receive the recommended pneumonia vaccines. Current USPSTF guidelines recommend a series of two vaccines for the best pneumonia protection.   -All adults should have a flu vaccine yearly and tetanus vaccine every 10 years.  -All adults age 48 and older should receive the shingles vaccines (series of two vaccines).        -All adults age 38-68 who are overweight should have a diabetes screening test once every three years.   -Other screening tests & preventive services for persons with diabetes include: an eye exam to screen for diabetic retinopathy, a kidney function test, a foot exam, and stricter control over your cholesterol.   -Cardiovascular screening for adults with routine risk involves an electrocardiogram (ECG) at intervals determined by the provider.   -Colorectal cancer screening should be done for adults age 54-65 with no increased risk factors for colorectal cancer. There are a number of acceptable methods of screening for this type of cancer. Each test has its own benefits and drawbacks. Discuss with your provider what is most appropriate for you during your annual wellness visit. The different tests include: colonoscopy (considered the best screening method), a fecal occult blood test, a fecal DNA test, and sigmoidoscopy.  -All adults born between [facility] should be screened once for Hepatitis C.  -An Abdominal Aortic Aneurysm (AAA) Screening is recommended for men age 73-68 who has ever smoked in their lifetime. Here is a list of your current Health Maintenance items (your personalized list of preventive services) with a due date:  Health Maintenance Due   Topic Date Due    COVID-19 Vaccine (3 - Booster for Lopez Peter series) 01/20/2022        Neck Arthritis: Exercises  Introduction  Here are some examples of exercises for you to try. The exercises may be suggested for a condition or for rehabilitation. Start each exercise slowly. Ease off the exercises if you start to have pain. You will be told when to start these exercises and which ones will work best for you. How to do the exercises  Neck stretches to the side    1. This stretch works best if you keep your shoulder down as you lean away from it. To help you remember to do this, start by relaxing your shoulders and lightly holding on to your thighs or your chair. 2. Tilt your head toward your shoulder and hold for 15 to 30 seconds. Let the weight of your head stretch your muscles. 3. Repeat 2 to 4 times toward each shoulder. Chin tuck    1.  Lie on the floor with a rolled-up towel under your neck. Your head should be touching the floor. 2. Slowly bring your chin toward your chest.  3. Hold for a count of 6, and then relax for up to 10 seconds. 4. Repeat 8 to 12 times. Active cervical rotation    1. Sit in a firm chair, or stand up straight. 2. Keeping your chin level, turn your head to the right, and hold for 15 to 30 seconds. 3. Turn your head to the left and hold for 15 to 30 seconds. 4. Repeat 2 to 4 times to each side. Shoulder blade squeeze    1. While standing, squeeze your shoulder blades together. 2. Do not raise your shoulders up as you are squeezing. 3. Hold for 6 seconds. 4. Repeat 8 to 12 times. Shoulder rolls    1. Sit comfortably with your feet shoulder-width apart. You can also do this exercise standing up. 2. Roll your shoulders up, then back, and then down in a smooth, circular motion. 3. Repeat 2 to 4 times. Follow-up care is a key part of your treatment and safety. Be sure to make and go to all appointments, and call your doctor if you are having problems. It's also a good idea to know your test results and keep a list of the medicines you take. Where can you learn more? Go to http://www.gray.com/  Enter V597 in the search box to learn more about \"Neck Arthritis: Exercises. \"  Current as of: July 1, 2021               Content Version: 13.2  © 2006-2022 Healthwise, [employer]. Care instructions adapted under license by [employer] (which disclaims liability or warranty for this information). If you have questions about a medical condition or this instruction, always ask your healthcare professional. [place] [address] any warranty or liability for your use of this information.

## 2022-05-28 LAB
ALBUMIN SERPL-MCNC: 4 G/DL (ref 3.5–5)
ALBUMIN/GLOB SERPL: 1 {RATIO} (ref 1.1–2.2)
ALP SERPL-CCNC: 50 U/L (ref 45–117)
ALT SERPL-CCNC: 24 U/L (ref 12–78)
ANION GAP SERPL CALC-SCNC: 2 MMOL/L (ref 5–15)
AST SERPL-CCNC: 18 U/L (ref 15–37)
BASOPHILS # BLD: 0.1 K/UL (ref 0–0.1)
BASOPHILS NFR BLD: 1 % (ref 0–1)
BILIRUB SERPL-MCNC: 0.4 MG/DL (ref 0.2–1)
BUN SERPL-MCNC: 16 MG/DL (ref 6–20)
BUN/CREAT SERPL: 20 (ref 12–20)
CALCIUM SERPL-MCNC: 9.8 MG/DL (ref 8.5–10.1)
CHLORIDE SERPL-SCNC: 105 MMOL/L (ref 97–108)
CK SERPL-CCNC: 143 U/L (ref 39–308)
CO2 SERPL-SCNC: 31 MMOL/L (ref 21–32)
CREAT SERPL-MCNC: 0.81 MG/DL (ref 0.7–1.3)
DIFFERENTIAL METHOD BLD: NORMAL
EOSINOPHIL # BLD: 0.1 K/UL (ref 0–0.4)
EOSINOPHIL NFR BLD: 2 % (ref 0–7)
ERYTHROCYTE [DISTWIDTH] IN BLOOD BY AUTOMATED COUNT: 13.2 % (ref 11.5–14.5)
GLOBULIN SER CALC-MCNC: 4 G/DL (ref 2–4)
GLUCOSE SERPL-MCNC: 93 MG/DL (ref 65–100)
HCT VFR BLD AUTO: 48.4 % (ref 36.6–50.3)
HGB BLD-MCNC: 15.5 G/DL (ref 12.1–17)
IMM GRANULOCYTES # BLD AUTO: 0 K/UL (ref 0–0.04)
IMM GRANULOCYTES NFR BLD AUTO: 0 % (ref 0–0.5)
LYMPHOCYTES # BLD: 2.4 K/UL (ref 0.8–3.5)
LYMPHOCYTES NFR BLD: 42 % (ref 12–49)
MCH RBC QN AUTO: 30.1 PG (ref 26–34)
MCHC RBC AUTO-ENTMCNC: 32 G/DL (ref 30–36.5)
MCV RBC AUTO: 94 FL (ref 80–99)
MONOCYTES # BLD: 0.5 K/UL (ref 0–1)
MONOCYTES NFR BLD: 9 % (ref 5–13)
NEUTS SEG # BLD: 2.6 K/UL (ref 1.8–8)
NEUTS SEG NFR BLD: 46 % (ref 32–75)
NRBC # BLD: 0 K/UL (ref 0–0.01)
NRBC BLD-RTO: 0 PER 100 WBC
PLATELET # BLD AUTO: 236 K/UL (ref 150–400)
PMV BLD AUTO: 10.2 FL (ref 8.9–12.9)
POTASSIUM SERPL-SCNC: 4.3 MMOL/L (ref 3.5–5.1)
PROT SERPL-MCNC: 8 G/DL (ref 6.4–8.2)
RBC # BLD AUTO: 5.15 M/UL (ref 4.1–5.7)
SODIUM SERPL-SCNC: 138 MMOL/L (ref 136–145)
VALPROATE SERPL-MCNC: 89 UG/ML (ref 50–100)
WBC # BLD AUTO: 5.7 K/UL (ref 4.1–11.1)

## 2022-05-30 LAB
GAMMA INTERFERON BACKGROUND BLD IA-ACNC: 0 IU/ML
M TB IFN-G BLD-IMP: NEGATIVE
M TB IFN-G CD4+ BCKGRND COR BLD-ACNC: 0 IU/ML
MITOGEN IGNF BLD-ACNC: >10 IU/ML
QUANTIFERON INCUBATION, QF1T: NORMAL
QUANTIFERON TB2 AG: 0 IU/ML
SERVICE CMNT-IMP: NORMAL

## 2023-05-30 ENCOUNTER — OFFICE VISIT (OUTPATIENT)
Age: 33
End: 2023-05-30
Payer: MEDICARE

## 2023-05-30 VITALS
SYSTOLIC BLOOD PRESSURE: 128 MMHG | DIASTOLIC BLOOD PRESSURE: 80 MMHG | WEIGHT: 177 LBS | RESPIRATION RATE: 14 BRPM | OXYGEN SATURATION: 97 % | BODY MASS INDEX: 26.83 KG/M2 | HEIGHT: 68 IN | HEART RATE: 90 BPM

## 2023-05-30 DIAGNOSIS — Z00.00 MEDICARE ANNUAL WELLNESS VISIT, SUBSEQUENT: ICD-10-CM

## 2023-05-30 DIAGNOSIS — M79.10 MUSCLE ACHE: ICD-10-CM

## 2023-05-30 DIAGNOSIS — Z00.00 MEDICARE ANNUAL WELLNESS VISIT, SUBSEQUENT: Primary | ICD-10-CM

## 2023-05-30 DIAGNOSIS — Z11.1 SCREENING-PULMONARY TB: ICD-10-CM

## 2023-05-30 DIAGNOSIS — G40.909 NONINTRACTABLE EPILEPSY WITHOUT STATUS EPILEPTICUS, UNSPECIFIED EPILEPSY TYPE (HCC): ICD-10-CM

## 2023-05-30 PROCEDURE — G8427 DOCREV CUR MEDS BY ELIG CLIN: HCPCS | Performed by: INTERNAL MEDICINE

## 2023-05-30 PROCEDURE — 1036F TOBACCO NON-USER: CPT | Performed by: INTERNAL MEDICINE

## 2023-05-30 PROCEDURE — 99214 OFFICE O/P EST MOD 30 MIN: CPT | Performed by: INTERNAL MEDICINE

## 2023-05-30 PROCEDURE — G0439 PPPS, SUBSEQ VISIT: HCPCS | Performed by: INTERNAL MEDICINE

## 2023-05-30 PROCEDURE — G8419 CALC BMI OUT NRM PARAM NOF/U: HCPCS | Performed by: INTERNAL MEDICINE

## 2023-05-30 RX ORDER — METHOCARBAMOL 750 MG/1
750 TABLET, FILM COATED ORAL NIGHTLY PRN
Qty: 30 TABLET | Refills: 1 | Status: SHIPPED | OUTPATIENT
Start: 2023-05-30

## 2023-05-30 SDOH — ECONOMIC STABILITY: HOUSING INSECURITY
IN THE LAST 12 MONTHS, WAS THERE A TIME WHEN YOU DID NOT HAVE A STEADY PLACE TO SLEEP OR SLEPT IN A SHELTER (INCLUDING NOW)?: NO

## 2023-05-30 SDOH — ECONOMIC STABILITY: INCOME INSECURITY: HOW HARD IS IT FOR YOU TO PAY FOR THE VERY BASICS LIKE FOOD, HOUSING, MEDICAL CARE, AND HEATING?: HARD

## 2023-05-30 SDOH — ECONOMIC STABILITY: FOOD INSECURITY: WITHIN THE PAST 12 MONTHS, THE FOOD YOU BOUGHT JUST DIDN'T LAST AND YOU DIDN'T HAVE MONEY TO GET MORE.: SOMETIMES TRUE

## 2023-05-30 SDOH — ECONOMIC STABILITY: FOOD INSECURITY: WITHIN THE PAST 12 MONTHS, YOU WORRIED THAT YOUR FOOD WOULD RUN OUT BEFORE YOU GOT MONEY TO BUY MORE.: SOMETIMES TRUE

## 2023-05-30 ASSESSMENT — PATIENT HEALTH QUESTIONNAIRE - PHQ9
2. FEELING DOWN, DEPRESSED OR HOPELESS: 0
1. LITTLE INTEREST OR PLEASURE IN DOING THINGS: 0
SUM OF ALL RESPONSES TO PHQ QUESTIONS 1-9: 0
SUM OF ALL RESPONSES TO PHQ9 QUESTIONS 1 & 2: 0

## 2023-05-30 ASSESSMENT — ENCOUNTER SYMPTOMS
CONSTIPATION: 0
NAUSEA: 0
VOMITING: 0
ABDOMINAL PAIN: 0
DIARRHEA: 0
SHORTNESS OF BREATH: 0

## 2023-05-30 ASSESSMENT — LIFESTYLE VARIABLES
HOW MANY STANDARD DRINKS CONTAINING ALCOHOL DO YOU HAVE ON A TYPICAL DAY: PATIENT DOES NOT DRINK
HOW OFTEN DO YOU HAVE A DRINK CONTAINING ALCOHOL: NEVER

## 2023-05-30 NOTE — PROGRESS NOTES
Assessment and Plan     Accompanied by mom who provides some history    1. Medicare annual wellness visit, subsequent  -     Tdap (ADACEL) 5-2-15.5 LF-MCG/0.5 injection; Inject 0.5 mLs into the muscle once for 1 dose, Disp-0.5 mL, R-0Print  -     CBC; Future  -     Hemoglobin A1C; Future  -     Lipid Panel; Future  -     Comprehensive Metabolic Panel; Future  2. Screening-pulmonary TB  -     QuantiFERON In Tube; Future  3. Nonintractable epilepsy without status epilepticus, unspecified epilepsy type ([facility]  -     Valproic Acid Level, Total; Future  4. 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 Methocarbamol sent to pharmacy    Epilepsy  No issues currently  Valproic acid level ordered  Last dose of depakote about 12 hours prior to this visit        Benefits, risks, possible drug interactions, and side effects of all new medications were reviewed with the patient. Pt verbalized understanding. Return to clinic:  as needed    An electronic signature was used to authenticate this note. Shavon Braxton MD  [facility]  5/30/2023    No future appointments. History of Present Illness   Chief Complaint   physical    Marco Herron is a 29 y.o. male         Review of Systems   Constitutional:  Negative for chills, fever and unexpected weight change. Respiratory:  Negative for shortness of breath.

## 2023-05-31 LAB
ALBUMIN SERPL-MCNC: 4.1 G/DL (ref 3.5–5)
ALBUMIN/GLOB SERPL: 1.1 (ref 1.1–2.2)
ALP SERPL-CCNC: 45 U/L (ref 45–117)
ALT SERPL-CCNC: 24 U/L (ref 12–78)
ANION GAP SERPL CALC-SCNC: 6 MMOL/L (ref 5–15)
AST SERPL-CCNC: 16 U/L (ref 15–37)
BILIRUB SERPL-MCNC: 0.4 MG/DL (ref 0.2–1)
BUN SERPL-MCNC: 18 MG/DL (ref 6–20)
BUN/CREAT SERPL: 23 (ref 12–20)
CALCIUM SERPL-MCNC: 9.6 MG/DL (ref 8.5–10.1)
CHLORIDE SERPL-SCNC: 104 MMOL/L (ref 97–108)
CHOLEST SERPL-MCNC: 196 MG/DL
CO2 SERPL-SCNC: 29 MMOL/L (ref 21–32)
CREAT SERPL-MCNC: 0.77 MG/DL (ref 0.7–1.3)
ERYTHROCYTE [DISTWIDTH] IN BLOOD BY AUTOMATED COUNT: 12.6 % (ref 11.5–14.5)
EST. AVERAGE GLUCOSE BLD GHB EST-MCNC: 105 MG/DL
GLOBULIN SER CALC-MCNC: 3.7 G/DL (ref 2–4)
GLUCOSE SERPL-MCNC: 83 MG/DL (ref 65–100)
HBA1C MFR BLD: 5.3 % (ref 4–5.6)
HCT VFR BLD AUTO: 47.1 % (ref 36.6–50.3)
HDLC SERPL-MCNC: 49 MG/DL
HDLC SERPL: 4 (ref 0–5)
HGB BLD-MCNC: 15.1 G/DL (ref 12.1–17)
LDLC SERPL CALC-MCNC: 115.4 MG/DL (ref 0–100)
MCH RBC QN AUTO: 29.8 PG (ref 26–34)
MCHC RBC AUTO-ENTMCNC: 32.1 G/DL (ref 30–36.5)
MCV RBC AUTO: 92.9 FL (ref 80–99)
NRBC # BLD: 0 K/UL (ref 0–0.01)
NRBC BLD-RTO: 0 PER 100 WBC
PLATELET # BLD AUTO: 225 K/UL (ref 150–400)
PMV BLD AUTO: 10.1 FL (ref 8.9–12.9)
POTASSIUM SERPL-SCNC: 4.4 MMOL/L (ref 3.5–5.1)
PROT SERPL-MCNC: 7.8 G/DL (ref 6.4–8.2)
RBC # BLD AUTO: 5.07 M/UL (ref 4.1–5.7)
SODIUM SERPL-SCNC: 139 MMOL/L (ref 136–145)
TRIGL SERPL-MCNC: 158 MG/DL
VALPROATE SERPL-MCNC: 78 UG/ML (ref 50–100)
VLDLC SERPL CALC-MCNC: 31.6 MG/DL
WBC # BLD AUTO: 5.1 K/UL (ref 4.1–11.1)

## 2023-06-03 LAB
M TB IFN-G BLD-IMP: NEGATIVE
M TB IFN-G CD4+ T-CELLS BLD-ACNC: 0 IU/ML
M TBIFN-G CD4+ CD8+T-CELLS BLD-ACNC: 0.04 IU/ML
QUANTIFERON CRITERIA: NORMAL
QUANTIFERON MITOGEN VALUE: >10 IU/ML
QUANTIFERON NIL VALUE: 0.01 IU/ML

## 2023-06-08 ENCOUNTER — TELEPHONE (OUTPATIENT)
Age: 33
End: 2023-06-08

## 2023-06-08 NOTE — TELEPHONE ENCOUNTER
Nurse called and lvm to advise of employment physical is ready for  can  in the office at the  if patient calls back please advise.

## 2023-06-26 ENCOUNTER — TELEPHONE (OUTPATIENT)
Age: 33
End: 2023-06-26

## 2023-07-12 NOTE — LETTER
5/27/2022 8:15 AM    Mr. Nirmal Mcgarry  [place] [id number]      To whom it may concern:     Nirmal Mcgarry was evaluated today for a full physical exam. Consuelo Espinoza is my opinion after this evaluation that he is desiring residence with his mother (under the sponsored residential program).  I am in agreement and see no contraindications to that plan.              Sincerely,        Juan Wright MD    normal

## 2023-08-30 RX ORDER — FAMOTIDINE 20 MG/1
TABLET, FILM COATED ORAL
Qty: 31 TABLET | Refills: 11 | Status: SHIPPED | OUTPATIENT
Start: 2023-08-30

## 2023-08-30 RX ORDER — LANOLIN ALCOHOL/MO/W.PET/CERES
CREAM (GRAM) TOPICAL
Qty: 31 TABLET | Refills: 11 | Status: SHIPPED | OUTPATIENT
Start: 2023-08-30

## 2023-08-30 RX ORDER — OMEPRAZOLE 40 MG/1
CAPSULE, DELAYED RELEASE ORAL
Qty: 31 CAPSULE | Refills: 11 | Status: SHIPPED | OUTPATIENT
Start: 2023-08-30

## 2023-08-30 RX ORDER — DIVALPROEX SODIUM 250 MG/1
TABLET, DELAYED RELEASE ORAL
Qty: 93 TABLET | Refills: 11 | Status: SHIPPED | OUTPATIENT
Start: 2023-08-30

## 2023-09-07 ENCOUNTER — TELEPHONE (OUTPATIENT)
Age: 33
End: 2023-09-07

## 2023-09-07 RX ORDER — FAMOTIDINE 20 MG/1
TABLET, FILM COATED ORAL
Qty: 30 TABLET | Refills: 5 | Status: SHIPPED | OUTPATIENT
Start: 2023-09-07

## 2023-09-07 RX ORDER — OMEPRAZOLE 40 MG/1
CAPSULE, DELAYED RELEASE ORAL
Qty: 30 CAPSULE | Refills: 5 | Status: SHIPPED | OUTPATIENT
Start: 2023-09-07

## 2023-09-07 NOTE — TELEPHONE ENCOUNTER
09/07/2023--I called and spoke with patient's mother (On patient's PHI form) and she stated that she just needed the prescriptions for the Omeprazole to be sent in and on the RX for that one say take QHS and the Pepcid resend in and state to take that one in the AM, as It works better for the patient that way. She wanted them sent in to the [address] [facility] for 30 days. I let her know that I would send them in. She verbalized understanding and had no further concerns at that time.

## 2023-09-20 ENCOUNTER — TELEPHONE (OUTPATIENT)
Age: 33
End: 2023-09-20

## 2023-09-20 NOTE — TELEPHONE ENCOUNTER
Patient's mother called requesting that orders be sent to patients pharmacy at [facility] for him to have PRN meds to include tylenol for fever, gas-x, and tums.  She is also requesting that patient's PM med time be changed from 8pm to 10pm.

## 2023-09-21 RX ORDER — SIMETHICONE 80 MG
80 TABLET,CHEWABLE ORAL 4 TIMES DAILY PRN
Qty: 180 TABLET | Refills: 3 | Status: SHIPPED | OUTPATIENT
Start: 2023-09-21

## 2023-09-21 RX ORDER — DIVALPROEX SODIUM 250 MG/1
TABLET, DELAYED RELEASE ORAL
Qty: 93 TABLET | Refills: 11 | Status: SHIPPED | OUTPATIENT
Start: 2023-09-21 | End: 2023-09-22 | Stop reason: SDUPTHER

## 2023-09-21 RX ORDER — ACETAMINOPHEN 325 MG/1
650 TABLET ORAL EVERY 4 HOURS PRN
Qty: 120 TABLET | Refills: 3 | Status: SHIPPED | OUTPATIENT
Start: 2023-09-21

## 2023-09-21 RX ORDER — CALCIUM CARBONATE 500 MG/1
1 TABLET, CHEWABLE ORAL 2 TIMES DAILY PRN
Qty: 60 TABLET | Refills: 0 | Status: SHIPPED | OUTPATIENT
Start: 2023-09-21 | End: 2023-10-21

## 2023-09-22 ENCOUNTER — TELEPHONE (OUTPATIENT)
Age: 33
End: 2023-09-22

## 2023-09-22 RX ORDER — LANOLIN ALCOHOL/MO/W.PET/CERES
CREAM (GRAM) TOPICAL
Qty: 31 TABLET | Refills: 11 | Status: SHIPPED | OUTPATIENT
Start: 2023-09-22

## 2023-09-22 RX ORDER — OMEPRAZOLE 40 MG/1
CAPSULE, DELAYED RELEASE ORAL
Qty: 30 CAPSULE | Refills: 5 | Status: SHIPPED | OUTPATIENT
Start: 2023-09-22

## 2023-09-22 RX ORDER — DIVALPROEX SODIUM 250 MG/1
TABLET, DELAYED RELEASE ORAL
Qty: 93 TABLET | Refills: 11 | Status: SHIPPED | OUTPATIENT
Start: 2023-09-22

## 2023-09-22 NOTE — TELEPHONE ENCOUNTER
Lula Apley has a lot to discuss with the nurse about Eva Correia.     Pat Mauro [phone] [facility] Phone)

## 2023-09-22 NOTE — TELEPHONE ENCOUNTER
I spoke with pt's mother, Coco Lopez. She is requesting that pts 3 prescriptions: Melatonin, Depakote and Omeprazole include take at 2200 not just at bedtime. Per mom pt stays up late and needs to take them a little later. Mom asked that we send in adjusted sig to [facility] pharmacy.        Sig adjusted on 3 rx and sent

## 2024-01-24 ENCOUNTER — TELEPHONE (OUTPATIENT)
Age: 34
End: 2024-01-24

## 2024-01-24 RX ORDER — MOMETASONE FUROATE 50 UG/1
SPRAY, METERED NASAL
Qty: 3 EACH | Refills: 2 | Status: SHIPPED | OUTPATIENT
Start: 2024-01-24

## 2024-01-24 NOTE — TELEPHONE ENCOUNTER
Patient is calling requesting refill of medication down below. Please advise           mometasone (NASONEX) 50 MCG/ACT nasal spray    [facility] - [place], [place] - [address] -  [phone] - F [phone]

## 2024-02-29 RX ORDER — FAMOTIDINE 20 MG/1
TABLET, FILM COATED ORAL
Qty: 29 TABLET | Refills: 11 | Status: SHIPPED | OUTPATIENT
Start: 2024-02-29

## 2024-06-03 RX ORDER — OMEPRAZOLE 40 MG/1
CAPSULE, DELAYED RELEASE ORAL
Qty: 31 CAPSULE | Refills: 12 | Status: SHIPPED | OUTPATIENT
Start: 2024-06-03

## 2024-06-06 ENCOUNTER — OFFICE VISIT (OUTPATIENT)
Age: 34
End: 2024-06-06
Payer: MEDICARE

## 2024-06-06 VITALS
TEMPERATURE: 98.1 F | BODY MASS INDEX: 26.98 KG/M2 | OXYGEN SATURATION: 97 % | HEART RATE: 80 BPM | SYSTOLIC BLOOD PRESSURE: 120 MMHG | DIASTOLIC BLOOD PRESSURE: 80 MMHG | RESPIRATION RATE: 16 BRPM | HEIGHT: 68 IN | WEIGHT: 178 LBS

## 2024-06-06 DIAGNOSIS — M79.10 MUSCLE ACHE: ICD-10-CM

## 2024-06-06 DIAGNOSIS — Z11.1 SCREENING-PULMONARY TB: ICD-10-CM

## 2024-06-06 DIAGNOSIS — K21.9 GASTROESOPHAGEAL REFLUX DISEASE WITHOUT ESOPHAGITIS: ICD-10-CM

## 2024-06-06 DIAGNOSIS — Z00.00 MEDICARE ANNUAL WELLNESS VISIT, SUBSEQUENT: ICD-10-CM

## 2024-06-06 DIAGNOSIS — E78.5 DYSLIPIDEMIA: ICD-10-CM

## 2024-06-06 DIAGNOSIS — Z00.00 MEDICARE ANNUAL WELLNESS VISIT, SUBSEQUENT: Primary | ICD-10-CM

## 2024-06-06 DIAGNOSIS — G40.909 NONINTRACTABLE EPILEPSY WITHOUT STATUS EPILEPTICUS, UNSPECIFIED EPILEPSY TYPE (HCC): ICD-10-CM

## 2024-06-06 PROCEDURE — G0439 PPPS, SUBSEQ VISIT: HCPCS | Performed by: NURSE PRACTITIONER

## 2024-06-06 RX ORDER — METHOCARBAMOL 750 MG/1
750 TABLET, FILM COATED ORAL NIGHTLY PRN
Qty: 30 TABLET | Refills: 1 | Status: SHIPPED | OUTPATIENT
Start: 2024-06-06

## 2024-06-06 SDOH — ECONOMIC STABILITY: FOOD INSECURITY: WITHIN THE PAST 12 MONTHS, YOU WORRIED THAT YOUR FOOD WOULD RUN OUT BEFORE YOU GOT MONEY TO BUY MORE.: NEVER TRUE

## 2024-06-06 SDOH — ECONOMIC STABILITY: FOOD INSECURITY: WITHIN THE PAST 12 MONTHS, THE FOOD YOU BOUGHT JUST DIDN'T LAST AND YOU DIDN'T HAVE MONEY TO GET MORE.: NEVER TRUE

## 2024-06-06 SDOH — ECONOMIC STABILITY: INCOME INSECURITY: HOW HARD IS IT FOR YOU TO PAY FOR THE VERY BASICS LIKE FOOD, HOUSING, MEDICAL CARE, AND HEATING?: NOT VERY HARD

## 2024-06-06 ASSESSMENT — PATIENT HEALTH QUESTIONNAIRE - PHQ9
SUM OF ALL RESPONSES TO PHQ QUESTIONS 1-9: 0
SUM OF ALL RESPONSES TO PHQ9 QUESTIONS 1 & 2: 0
1. LITTLE INTEREST OR PLEASURE IN DOING THINGS: NOT AT ALL
SUM OF ALL RESPONSES TO PHQ QUESTIONS 1-9: 0
2. FEELING DOWN, DEPRESSED OR HOPELESS: NOT AT ALL
SUM OF ALL RESPONSES TO PHQ QUESTIONS 1-9: 0
SUM OF ALL RESPONSES TO PHQ QUESTIONS 1-9: 0

## 2024-06-06 NOTE — PATIENT INSTRUCTIONS
is possible to meet your calcium requirement with diet alone, but a vitamin D supplement is usually necessary to meet this goal.  When exposed to the sun, use a sunscreen that protects against both UVA and UVB radiation with an SPF of 30 or greater. Reapply every 2 to 3 hours or after sweating, drying off with a towel, or swimming.  Always wear a seat belt when traveling in a car. Always wear a helmet when riding a bicycle or motorcycle.

## 2024-06-06 NOTE — PROGRESS NOTES
aches) Yes Luke Sanders, Edie, APRN - CNP   omeprazole (PRILOSEC) 40 MG delayed release capsule TAKE 1 CAPSULE BY MOUTH AT BEDTIME Yes Manjinder Bartholomew MD   famotidine (PEPCID) 20 MG tablet TAKE 1 TABLET BY MOUTH EVERY MORNING Yes Manjinder Bartholomew MD   mometasone (NASONEX) 50 MCG/ACT nasal spray INSTILL 2 SPRAYS INTO BOTH NOSTRILS EVERY DAY AS NEEDED Yes Manjinder Bartholomew MD   divalproex (DEPAKOTE) 250 MG DR tablet TAKE 3 TABLETS (=750mg) BY MOUTH EVERY NIGHT AT BEDTIME AT 2200 Yes Manjinder Bartholomew MD   melatonin 3 MG TABS tablet TAKE 1 TABLET BY MOUTH EVERY NIGHT AT 2200 FOR SLEEP Yes Manjinder Bartholomew MD   simethicone (MYLICON) 80 MG chewable tablet Take 1 tablet by mouth 4 times daily as needed for Flatulence Yes Manjinder Bartholomew MD   acetaminophen (AMINOFEN) 325 MG tablet Take 2 tablets by mouth every 4 hours as needed for Pain or Fever Yes Manjinder Bartholomew MD   cetirizine (ZYRTEC) 10 MG tablet 1 TABLET BY MOUTH EVERY DAY AS NEEDED FOR ALLERGIES Yes Automatic Reconciliation, Ar   ibuprofen (ADVIL;MOTRIN) 200 MG tablet 1 TABLET BY MOUTH EVERY 6 HOURS AS NEEDED FOR PAIN Yes Automatic Reconciliation, Ar       CareTeam (Including outside providers/suppliers regularly involved in providing care):   Patient Care Team:  Manjinder Bartholomew MD as PCP - General  Manjinder Bartholomew MD as PCP - Empaneled Provider     Reviewed and updated this visit:  Tobacco  Allergies  Meds  Problems  Med Hx  Surg Hx  Soc Hx  Fam Hx

## 2024-06-07 LAB
ALBUMIN SERPL-MCNC: 4.7 G/DL (ref 4.1–5.1)
ALBUMIN/GLOB SERPL: 1.7 {RATIO} (ref 1.2–2.2)
ALP SERPL-CCNC: 47 IU/L (ref 44–121)
ALT SERPL-CCNC: 23 IU/L (ref 0–44)
AST SERPL-CCNC: 18 IU/L (ref 0–40)
BILIRUB SERPL-MCNC: 0.4 MG/DL (ref 0–1.2)
BUN SERPL-MCNC: 16 MG/DL (ref 6–20)
BUN/CREAT SERPL: 24 (ref 9–20)
CALCIUM SERPL-MCNC: 9.7 MG/DL (ref 8.7–10.2)
CHLORIDE SERPL-SCNC: 105 MMOL/L (ref 96–106)
CHOLEST SERPL-MCNC: 205 MG/DL (ref 100–199)
CO2 SERPL-SCNC: 23 MMOL/L (ref 20–29)
CREAT SERPL-MCNC: 0.68 MG/DL (ref 0.76–1.27)
EGFRCR SERPLBLD CKD-EPI 2021: 127 ML/MIN/1.73
ERYTHROCYTE [DISTWIDTH] IN BLOOD BY AUTOMATED COUNT: 13.7 % (ref 11.6–15.4)
GLOBULIN SER CALC-MCNC: 2.8 G/DL (ref 1.5–4.5)
GLUCOSE SERPL-MCNC: 89 MG/DL (ref 70–99)
HCT VFR BLD AUTO: 44.1 % (ref 37.5–51)
HDLC SERPL-MCNC: 43 MG/DL
HGB BLD-MCNC: 15.1 G/DL (ref 13–17.7)
IMP & REVIEW OF LAB RESULTS: NORMAL
LDLC SERPL CALC-MCNC: 127 MG/DL (ref 0–99)
MCH RBC QN AUTO: 30.2 PG (ref 26.6–33)
MCHC RBC AUTO-ENTMCNC: 34.2 G/DL (ref 31.5–35.7)
MCV RBC AUTO: 88 FL (ref 79–97)
PLATELET # BLD AUTO: 238 X10E3/UL (ref 150–450)
POTASSIUM SERPL-SCNC: 4.4 MMOL/L (ref 3.5–5.2)
PROT SERPL-MCNC: 7.5 G/DL (ref 6–8.5)
RBC # BLD AUTO: 5 X10E6/UL (ref 4.14–5.8)
SODIUM SERPL-SCNC: 142 MMOL/L (ref 134–144)
TRIGL SERPL-MCNC: 196 MG/DL (ref 0–149)
TSH SERPL DL<=0.005 MIU/L-ACNC: 2.14 UIU/ML (ref 0.45–4.5)
VLDLC SERPL CALC-MCNC: 35 MG/DL (ref 5–40)
WBC # BLD AUTO: 5.2 X10E3/UL (ref 3.4–10.8)

## 2024-06-11 LAB
GAMMA INTERFERON BACKGROUND BLD IA-ACNC: 0.01 IU/ML
M TB IFN-G BLD-IMP: NEGATIVE
M TB IFN-G CD4+ BCKGRND COR BLD-ACNC: 0.01 IU/ML
M TB IFN-G CD4+CD8+ BCKGRND COR BLD-ACNC: 0.01 IU/ML
MITOGEN IGNF BCKGRD COR BLD-ACNC: >10 IU/ML
SERVICE CMNT-IMP: NORMAL

## 2024-08-29 RX ORDER — LANOLIN ALCOHOL/MO/W.PET/CERES
CREAM (GRAM) TOPICAL
Qty: 30 TABLET | Refills: 0 | Status: SHIPPED | OUTPATIENT
Start: 2024-08-29

## 2024-08-29 RX ORDER — DIVALPROEX SODIUM 250 MG/1
TABLET, DELAYED RELEASE ORAL
Qty: 90 TABLET | Refills: 0 | Status: SHIPPED | OUTPATIENT
Start: 2024-08-29

## 2024-10-01 RX ORDER — LANOLIN ALCOHOL/MO/W.PET/CERES
CREAM (GRAM) TOPICAL
Qty: 30 TABLET | Refills: 11 | Status: SHIPPED | OUTPATIENT
Start: 2024-10-01

## 2024-10-01 RX ORDER — DIVALPROEX SODIUM 250 MG/1
TABLET, DELAYED RELEASE ORAL
Qty: 90 TABLET | Refills: 11 | Status: SHIPPED | OUTPATIENT
Start: 2024-10-01

## 2025-01-27 RX ORDER — MOMETASONE FUROATE MONOHYDRATE 50 UG/1
SPRAY, METERED NASAL
Qty: 17 G | Refills: 12 | Status: SHIPPED | OUTPATIENT
Start: 2025-01-27

## 2025-02-28 RX ORDER — FAMOTIDINE 20 MG/1
TABLET, FILM COATED ORAL
Qty: 30 TABLET | Refills: 0 | Status: SHIPPED | OUTPATIENT
Start: 2025-02-28

## 2025-03-28 RX ORDER — FAMOTIDINE 20 MG/1
20 TABLET, FILM COATED ORAL EVERY MORNING
Qty: 30 TABLET | Refills: 0 | OUTPATIENT
Start: 2025-03-28

## 2025-04-08 ENCOUNTER — TELEPHONE (OUTPATIENT)
Facility: CLINIC | Age: 35
End: 2025-04-08

## 2025-04-08 NOTE — TELEPHONE ENCOUNTER
Attempted to contact patient regarding upcoming Medicare wellness appointment and completion of HRA questionnaire. LVM for patient to please return call at  [phone]

## 2025-04-09 ENCOUNTER — TELEPHONE (OUTPATIENT)
Facility: CLINIC | Age: 35
End: 2025-04-09

## 2025-04-09 NOTE — TELEPHONE ENCOUNTER
Attempted to contact patient regarding upcoming Medicare wellness appointment and completion of HRA questionnaire. LVM for patient to please return call at  [phone]

## 2025-04-10 ENCOUNTER — TELEPHONE (OUTPATIENT)
Facility: CLINIC | Age: 35
End: 2025-04-10

## 2025-04-10 ENCOUNTER — OFFICE VISIT (OUTPATIENT)
Facility: CLINIC | Age: 35
End: 2025-04-10
Payer: MEDICARE

## 2025-04-10 VITALS
TEMPERATURE: 98 F | SYSTOLIC BLOOD PRESSURE: 134 MMHG | OXYGEN SATURATION: 98 % | HEIGHT: 68 IN | DIASTOLIC BLOOD PRESSURE: 86 MMHG | BODY MASS INDEX: 27.28 KG/M2 | HEART RATE: 76 BPM | RESPIRATION RATE: 16 BRPM | WEIGHT: 180 LBS

## 2025-04-10 DIAGNOSIS — G40.909 NONINTRACTABLE EPILEPSY WITHOUT STATUS EPILEPTICUS, UNSPECIFIED EPILEPSY TYPE (HCC): ICD-10-CM

## 2025-04-10 DIAGNOSIS — E78.5 HYPERLIPIDEMIA, UNSPECIFIED HYPERLIPIDEMIA TYPE: Primary | ICD-10-CM

## 2025-04-10 DIAGNOSIS — R03.0 ELEVATED BLOOD PRESSURE READING: ICD-10-CM

## 2025-04-10 DIAGNOSIS — K21.9 GASTROESOPHAGEAL REFLUX DISEASE WITHOUT ESOPHAGITIS: ICD-10-CM

## 2025-04-10 DIAGNOSIS — Z11.1 SCREENING-PULMONARY TB: ICD-10-CM

## 2025-04-10 DIAGNOSIS — E78.5 HYPERLIPIDEMIA, UNSPECIFIED HYPERLIPIDEMIA TYPE: ICD-10-CM

## 2025-04-10 PROCEDURE — 99214 OFFICE O/P EST MOD 30 MIN: CPT | Performed by: INTERNAL MEDICINE

## 2025-04-10 RX ORDER — FAMOTIDINE 20 MG/1
TABLET, FILM COATED ORAL
Qty: 30 TABLET | Refills: 11 | Status: SHIPPED | OUTPATIENT
Start: 2025-04-10

## 2025-04-10 SDOH — ECONOMIC STABILITY: FOOD INSECURITY: WITHIN THE PAST 12 MONTHS, YOU WORRIED THAT YOUR FOOD WOULD RUN OUT BEFORE YOU GOT MONEY TO BUY MORE.: NEVER TRUE

## 2025-04-10 SDOH — ECONOMIC STABILITY: FOOD INSECURITY: WITHIN THE PAST 12 MONTHS, THE FOOD YOU BOUGHT JUST DIDN'T LAST AND YOU DIDN'T HAVE MONEY TO GET MORE.: NEVER TRUE

## 2025-04-10 SDOH — HEALTH STABILITY: PHYSICAL HEALTH: ON AVERAGE, HOW MANY DAYS PER WEEK DO YOU ENGAGE IN MODERATE TO STRENUOUS EXERCISE (LIKE A BRISK WALK)?: 5 DAYS

## 2025-04-10 SDOH — HEALTH STABILITY: PHYSICAL HEALTH: ON AVERAGE, HOW MANY MINUTES DO YOU ENGAGE IN EXERCISE AT THIS LEVEL?: 100 MIN

## 2025-04-10 ASSESSMENT — LIFESTYLE VARIABLES
HOW OFTEN DO YOU HAVE A DRINK CONTAINING ALCOHOL: 2
HOW OFTEN DO YOU HAVE SIX OR MORE DRINKS ON ONE OCCASION: 1
HOW MANY STANDARD DRINKS CONTAINING ALCOHOL DO YOU HAVE ON A TYPICAL DAY: 1 OR 2
HOW OFTEN DO YOU HAVE A DRINK CONTAINING ALCOHOL: MONTHLY OR LESS
HOW MANY STANDARD DRINKS CONTAINING ALCOHOL DO YOU HAVE ON A TYPICAL DAY: 1

## 2025-04-10 ASSESSMENT — PATIENT HEALTH QUESTIONNAIRE - PHQ9
1. LITTLE INTEREST OR PLEASURE IN DOING THINGS: NOT AT ALL
SUM OF ALL RESPONSES TO PHQ QUESTIONS 1-9: 0
SUM OF ALL RESPONSES TO PHQ QUESTIONS 1-9: 0
2. FEELING DOWN, DEPRESSED OR HOPELESS: NOT AT ALL
SUM OF ALL RESPONSES TO PHQ QUESTIONS 1-9: 0
SUM OF ALL RESPONSES TO PHQ QUESTIONS 1-9: 0

## 2025-04-10 ASSESSMENT — ENCOUNTER SYMPTOMS
FACIAL SWELLING: 0
ABDOMINAL PAIN: 0
SHORTNESS OF BREATH: 0
EYE PAIN: 0
EYE DISCHARGE: 0
CONSTIPATION: 0
EYE ITCHING: 0
DIARRHEA: 0
SORE THROAT: 0
BACK PAIN: 0
COLOR CHANGE: 0
WHEEZING: 0

## 2025-04-10 NOTE — PROGRESS NOTES
maintain a healthy diet and exercise routine to manage weight.    3. Gastroesophageal Reflux Disease.  Reflux symptoms are well-controlled with current medication regimen of Pepcid in the morning and omeprazole at night. Experiences bloating if eating late at night or consuming heavy meals late in the evening. Advised to avoid late-night eating and heavy meals to prevent bloating. Prescription for famotidine sent to pharmacy. If symptoms worsen, a referral to a gastroenterologist may be considered.    4. Seizure Disorder.  No seizures reported for some time, remains on Depakote. Compliant with medication regimen, no breakthrough seizures. Continue current medication regimen.      Assessment/Plan  Pollo was seen today for medication refill.    Diagnoses and all orders for this visit:    Hyperlipidemia, unspecified hyperlipidemia type  -     Comprehensive Metabolic Panel; Future  -     CBC with Auto Differential; Future  -     Lipid Panel; Future    Gastroesophageal reflux disease without esophagitis  -     famotidine (PEPCID) 20 MG tablet; TAKE 1 TABLET BY MOUTH EVERY MORNINGTAKE 1 TABLET BY MOUTH EVERY MORNING    Nonintractable epilepsy without status epilepticus, unspecified epilepsy type (HCC)    Elevated blood pressure reading    Screening-pulmonary TB  -     QuantiFERON In Tube ([facility] Default); Future        No follow-up provider specified.    Manjinder Bartholomew MD  4/10/2025    This note was created with the help of speech recognition software (Dragon) and may contain some 'sound alike' errors.

## 2025-04-10 NOTE — TELEPHONE ENCOUNTER
Attempted to contact patient regarding upcoming Medicare wellness appointment and completion of HRA questionnaire. LVM for patient to please return call at  [phone]

## 2025-04-11 ENCOUNTER — RESULTS FOLLOW-UP (OUTPATIENT)
Facility: CLINIC | Age: 35
End: 2025-04-11

## 2025-04-11 LAB
ALBUMIN SERPL-MCNC: 4.7 G/DL (ref 4.1–5.1)
ALP SERPL-CCNC: 53 IU/L (ref 44–121)
ALT SERPL-CCNC: 24 IU/L (ref 0–44)
AST SERPL-CCNC: 21 IU/L (ref 0–40)
BASOPHILS # BLD AUTO: 0.1 X10E3/UL (ref 0–0.2)
BASOPHILS NFR BLD AUTO: 1 %
BILIRUB SERPL-MCNC: 0.3 MG/DL (ref 0–1.2)
BUN SERPL-MCNC: 13 MG/DL (ref 6–20)
BUN/CREAT SERPL: 19 (ref 9–20)
CALCIUM SERPL-MCNC: 9.4 MG/DL (ref 8.7–10.2)
CHLORIDE SERPL-SCNC: 102 MMOL/L (ref 96–106)
CHOLEST SERPL-MCNC: 211 MG/DL (ref 100–199)
CO2 SERPL-SCNC: 21 MMOL/L (ref 20–29)
CREAT SERPL-MCNC: 0.68 MG/DL (ref 0.76–1.27)
EGFRCR SERPLBLD CKD-EPI 2021: 127 ML/MIN/1.73
EOSINOPHIL # BLD AUTO: 0.1 X10E3/UL (ref 0–0.4)
EOSINOPHIL NFR BLD AUTO: 2 %
ERYTHROCYTE [DISTWIDTH] IN BLOOD BY AUTOMATED COUNT: 13.8 % (ref 11.6–15.4)
GLOBULIN SER CALC-MCNC: 2.7 G/DL (ref 1.5–4.5)
GLUCOSE SERPL-MCNC: 76 MG/DL (ref 70–99)
HCT VFR BLD AUTO: 47.6 % (ref 37.5–51)
HDLC SERPL-MCNC: 45 MG/DL
HGB BLD-MCNC: 15.5 G/DL (ref 13–17.7)
IMM GRANULOCYTES # BLD AUTO: 0 X10E3/UL (ref 0–0.1)
IMM GRANULOCYTES NFR BLD AUTO: 0 %
IMP & REVIEW OF LAB RESULTS: NORMAL
LDLC SERPL CALC-MCNC: 136 MG/DL (ref 0–99)
LYMPHOCYTES # BLD AUTO: 1.9 X10E3/UL (ref 0.7–3.1)
LYMPHOCYTES NFR BLD AUTO: 39 %
MCH RBC QN AUTO: 29.4 PG (ref 26.6–33)
MCHC RBC AUTO-ENTMCNC: 32.6 G/DL (ref 31.5–35.7)
MCV RBC AUTO: 90 FL (ref 79–97)
MONOCYTES # BLD AUTO: 0.4 X10E3/UL (ref 0.1–0.9)
MONOCYTES NFR BLD AUTO: 8 %
NEUTROPHILS # BLD AUTO: 2.5 X10E3/UL (ref 1.4–7)
NEUTROPHILS NFR BLD AUTO: 50 %
PLATELET # BLD AUTO: 248 X10E3/UL (ref 150–450)
POTASSIUM SERPL-SCNC: 4.4 MMOL/L (ref 3.5–5.2)
PROT SERPL-MCNC: 7.4 G/DL (ref 6–8.5)
RBC # BLD AUTO: 5.27 X10E6/UL (ref 4.14–5.8)
SODIUM SERPL-SCNC: 142 MMOL/L (ref 134–144)
TRIGL SERPL-MCNC: 169 MG/DL (ref 0–149)
VLDLC SERPL CALC-MCNC: 30 MG/DL (ref 5–40)
WBC # BLD AUTO: 4.9 X10E3/UL (ref 3.4–10.8)

## 2025-04-16 LAB
GAMMA INTERFERON BACKGROUND BLD IA-ACNC: 0.11 IU/ML
M TB IFN-G BLD-IMP: NEGATIVE
M TB IFN-G CD4+ BCKGRND COR BLD-ACNC: 0.05 IU/ML
M TB IFN-G CD4+CD8+ BCKGRND COR BLD-ACNC: 0.06 IU/ML
MITOGEN IGNF BCKGRD COR BLD-ACNC: >10 IU/ML
SERVICE CMNT-IMP: NORMAL

## 2025-05-30 RX ORDER — OMEPRAZOLE 40 MG/1
40 CAPSULE, DELAYED RELEASE ORAL NIGHTLY
Qty: 30 CAPSULE | Refills: 0 | Status: SHIPPED | OUTPATIENT
Start: 2025-05-30

## 2025-06-30 RX ORDER — OMEPRAZOLE 40 MG/1
40 CAPSULE, DELAYED RELEASE ORAL NIGHTLY
Qty: 30 CAPSULE | Refills: 2 | Status: SHIPPED | OUTPATIENT
Start: 2025-06-30